# Patient Record
Sex: FEMALE | Race: BLACK OR AFRICAN AMERICAN | NOT HISPANIC OR LATINO
[De-identification: names, ages, dates, MRNs, and addresses within clinical notes are randomized per-mention and may not be internally consistent; named-entity substitution may affect disease eponyms.]

---

## 2017-01-28 ENCOUNTER — RESULT REVIEW (OUTPATIENT)
Age: 32
End: 2017-01-28

## 2017-01-28 ENCOUNTER — INPATIENT (INPATIENT)
Facility: HOSPITAL | Age: 32
LOS: 2 days | Discharge: HOME | End: 2017-01-31
Attending: OBSTETRICS & GYNECOLOGY | Admitting: OBSTETRICS & GYNECOLOGY
Payer: COMMERCIAL

## 2017-01-28 VITALS — WEIGHT: 169.76 LBS | HEIGHT: 63 IN

## 2017-01-28 DIAGNOSIS — O26.899 OTHER SPECIFIED PREGNANCY RELATED CONDITIONS, UNSPECIFIED TRIMESTER: ICD-10-CM

## 2017-01-28 DIAGNOSIS — Z3A.00 WEEKS OF GESTATION OF PREGNANCY NOT SPECIFIED: ICD-10-CM

## 2017-01-28 LAB
BASOPHILS # BLD AUTO: 0.02 K/UL — SIGNIFICANT CHANGE UP (ref 0–0.2)
BASOPHILS NFR BLD AUTO: 0.1 % — SIGNIFICANT CHANGE UP (ref 0–2)
EOSINOPHIL # BLD AUTO: 0.02 K/UL — SIGNIFICANT CHANGE UP (ref 0–0.5)
EOSINOPHIL NFR BLD AUTO: 0.1 % — SIGNIFICANT CHANGE UP (ref 0–6)
HCT VFR BLD CALC: 33.9 % — LOW (ref 34.5–45)
HGB BLD-MCNC: 10.9 G/DL — LOW (ref 11.5–15.5)
IMM GRANULOCYTES NFR BLD AUTO: 0.5 % — SIGNIFICANT CHANGE UP (ref 0–1.5)
LYMPHOCYTES # BLD AUTO: 25.1 % — SIGNIFICANT CHANGE UP (ref 13–44)
LYMPHOCYTES # BLD AUTO: 3.39 K/UL — HIGH (ref 1–3.3)
MCHC RBC-ENTMCNC: 27.4 PG — SIGNIFICANT CHANGE UP (ref 27–34)
MCHC RBC-ENTMCNC: 32.2 % — SIGNIFICANT CHANGE UP (ref 32–36)
MCV RBC AUTO: 85.2 FL — SIGNIFICANT CHANGE UP (ref 80–100)
MONOCYTES # BLD AUTO: 1.1 K/UL — HIGH (ref 0–0.9)
MONOCYTES NFR BLD AUTO: 8.1 % — SIGNIFICANT CHANGE UP (ref 2–14)
NEUTROPHILS # BLD AUTO: 8.9 K/UL — HIGH (ref 1.8–7.4)
NEUTROPHILS NFR BLD AUTO: 66.1 % — SIGNIFICANT CHANGE UP (ref 43–77)
PLATELET # BLD AUTO: 226 K/UL — SIGNIFICANT CHANGE UP (ref 150–400)
PMV BLD: 10.5 FL — SIGNIFICANT CHANGE UP (ref 7–13)
RBC # BLD: 3.98 M/UL — SIGNIFICANT CHANGE UP (ref 3.8–5.2)
RBC # FLD: 16.7 % — HIGH (ref 10.3–14.5)
WBC # BLD: 13.5 K/UL — HIGH (ref 3.8–10.5)
WBC # FLD AUTO: 13.5 K/UL — HIGH (ref 3.8–10.5)

## 2017-01-28 RX ORDER — OXYTOCIN 10 UNIT/ML
333.33 VIAL (ML) INJECTION
Qty: 20 | Refills: 0 | Status: DISCONTINUED | OUTPATIENT
Start: 2017-01-28 | End: 2017-01-29

## 2017-01-28 RX ORDER — INFLUENZA VIRUS VACCINE 15; 15; 15; 15 UG/.5ML; UG/.5ML; UG/.5ML; UG/.5ML
0.5 SUSPENSION INTRAMUSCULAR ONCE
Qty: 0 | Refills: 0 | Status: COMPLETED | OUTPATIENT
Start: 2017-01-28 | End: 2017-01-30

## 2017-01-28 RX ORDER — SODIUM CHLORIDE 9 MG/ML
1000 INJECTION, SOLUTION INTRAVENOUS ONCE
Qty: 0 | Refills: 0 | Status: COMPLETED | OUTPATIENT
Start: 2017-01-28 | End: 2017-01-28

## 2017-01-28 RX ORDER — SODIUM CHLORIDE 9 MG/ML
1000 INJECTION, SOLUTION INTRAVENOUS
Qty: 0 | Refills: 0 | Status: DISCONTINUED | OUTPATIENT
Start: 2017-01-28 | End: 2017-01-29

## 2017-01-28 RX ADMIN — SODIUM CHLORIDE 2000 MILLILITER(S): 9 INJECTION, SOLUTION INTRAVENOUS at 23:30

## 2017-01-29 LAB
ANISOCYTOSIS BLD QL: SLIGHT — SIGNIFICANT CHANGE UP
BASOPHILS NFR SPEC: 0 % — SIGNIFICANT CHANGE UP (ref 0–2)
BLD GP AB SCN SERPL QL: NEGATIVE — SIGNIFICANT CHANGE UP
EOSINOPHIL NFR FLD: 0 % — SIGNIFICANT CHANGE UP (ref 0–6)
LYMPHOCYTES NFR SPEC AUTO: 18 % — SIGNIFICANT CHANGE UP (ref 13–44)
MANUAL SMEAR VERIFICATION: SIGNIFICANT CHANGE UP
MONOCYTES NFR BLD: 6 % — SIGNIFICANT CHANGE UP (ref 2–9)
NEUTROPHIL AB SER-ACNC: 76 % — SIGNIFICANT CHANGE UP (ref 43–77)
PLATELET COUNT - ESTIMATE: NORMAL — SIGNIFICANT CHANGE UP
RH IG SCN BLD-IMP: POSITIVE — SIGNIFICANT CHANGE UP
RH IG SCN BLD-IMP: POSITIVE — SIGNIFICANT CHANGE UP

## 2017-01-29 PROCEDURE — 88307 TISSUE EXAM BY PATHOLOGIST: CPT | Mod: 26

## 2017-01-29 RX ORDER — MAGNESIUM HYDROXIDE 400 MG/1
30 TABLET, CHEWABLE ORAL
Qty: 0 | Refills: 0 | Status: DISCONTINUED | OUTPATIENT
Start: 2017-01-29 | End: 2017-01-31

## 2017-01-29 RX ORDER — OXYTOCIN 10 UNIT/ML
2 VIAL (ML) INJECTION
Qty: 30 | Refills: 0 | Status: DISCONTINUED | OUTPATIENT
Start: 2017-01-29 | End: 2017-01-29

## 2017-01-29 RX ORDER — IBUPROFEN 200 MG
600 TABLET ORAL EVERY 6 HOURS
Qty: 0 | Refills: 0 | Status: DISCONTINUED | OUTPATIENT
Start: 2017-01-29 | End: 2017-01-29

## 2017-01-29 RX ORDER — AER TRAVELER 0.5 G/1
1 SOLUTION RECTAL; TOPICAL EVERY 4 HOURS
Qty: 0 | Refills: 0 | Status: DISCONTINUED | OUTPATIENT
Start: 2017-01-29 | End: 2017-01-31

## 2017-01-29 RX ORDER — ONDANSETRON 8 MG/1
4 TABLET, FILM COATED ORAL ONCE
Qty: 0 | Refills: 0 | Status: COMPLETED | OUTPATIENT
Start: 2017-01-29 | End: 2017-01-29

## 2017-01-29 RX ORDER — SODIUM CHLORIDE 9 MG/ML
3 INJECTION INTRAMUSCULAR; INTRAVENOUS; SUBCUTANEOUS EVERY 8 HOURS
Qty: 0 | Refills: 0 | Status: DISCONTINUED | OUTPATIENT
Start: 2017-01-29 | End: 2017-01-29

## 2017-01-29 RX ORDER — DIPHENHYDRAMINE HCL 50 MG
25 CAPSULE ORAL EVERY 6 HOURS
Qty: 0 | Refills: 0 | Status: DISCONTINUED | OUTPATIENT
Start: 2017-01-29 | End: 2017-01-31

## 2017-01-29 RX ORDER — OXYTOCIN 10 UNIT/ML
41.67 VIAL (ML) INJECTION
Qty: 20 | Refills: 0 | Status: DISCONTINUED | OUTPATIENT
Start: 2017-01-29 | End: 2017-01-29

## 2017-01-29 RX ORDER — DIBUCAINE 1 %
1 OINTMENT (GRAM) RECTAL EVERY 4 HOURS
Qty: 0 | Refills: 0 | Status: DISCONTINUED | OUTPATIENT
Start: 2017-01-29 | End: 2017-01-31

## 2017-01-29 RX ORDER — TETANUS TOXOID, REDUCED DIPHTHERIA TOXOID AND ACELLULAR PERTUSSIS VACCINE, ADSORBED 5; 2.5; 8; 8; 2.5 [IU]/.5ML; [IU]/.5ML; UG/.5ML; UG/.5ML; UG/.5ML
0.5 SUSPENSION INTRAMUSCULAR ONCE
Qty: 0 | Refills: 0 | Status: COMPLETED | OUTPATIENT
Start: 2017-01-29

## 2017-01-29 RX ORDER — HYDROCORTISONE 1 %
1 OINTMENT (GRAM) TOPICAL EVERY 4 HOURS
Qty: 0 | Refills: 0 | Status: DISCONTINUED | OUTPATIENT
Start: 2017-01-29 | End: 2017-01-29

## 2017-01-29 RX ORDER — KETOROLAC TROMETHAMINE 30 MG/ML
30 SYRINGE (ML) INJECTION ONCE
Qty: 0 | Refills: 0 | Status: DISCONTINUED | OUTPATIENT
Start: 2017-01-29 | End: 2017-01-29

## 2017-01-29 RX ORDER — AER TRAVELER 0.5 G/1
1 SOLUTION RECTAL; TOPICAL EVERY 4 HOURS
Qty: 0 | Refills: 0 | Status: DISCONTINUED | OUTPATIENT
Start: 2017-01-29 | End: 2017-01-29

## 2017-01-29 RX ORDER — PRAMOXINE HYDROCHLORIDE 150 MG/15G
1 AEROSOL, FOAM RECTAL EVERY 4 HOURS
Qty: 0 | Refills: 0 | Status: DISCONTINUED | OUTPATIENT
Start: 2017-01-29 | End: 2017-01-29

## 2017-01-29 RX ORDER — SODIUM CHLORIDE 9 MG/ML
3 INJECTION INTRAMUSCULAR; INTRAVENOUS; SUBCUTANEOUS EVERY 8 HOURS
Qty: 0 | Refills: 0 | Status: DISCONTINUED | OUTPATIENT
Start: 2017-01-29 | End: 2017-01-30

## 2017-01-29 RX ORDER — OXYCODONE HYDROCHLORIDE 5 MG/1
5 TABLET ORAL
Qty: 0 | Refills: 0 | Status: DISCONTINUED | OUTPATIENT
Start: 2017-01-29 | End: 2017-01-31

## 2017-01-29 RX ORDER — ACETAMINOPHEN 500 MG
975 TABLET ORAL EVERY 6 HOURS
Qty: 0 | Refills: 0 | Status: DISCONTINUED | OUTPATIENT
Start: 2017-01-29 | End: 2017-01-31

## 2017-01-29 RX ORDER — DIBUCAINE 1 %
1 OINTMENT (GRAM) RECTAL EVERY 4 HOURS
Qty: 0 | Refills: 0 | Status: DISCONTINUED | OUTPATIENT
Start: 2017-01-29 | End: 2017-01-29

## 2017-01-29 RX ORDER — OXYTOCIN 10 UNIT/ML
41.67 VIAL (ML) INJECTION
Qty: 20 | Refills: 0 | Status: DISCONTINUED | OUTPATIENT
Start: 2017-01-29 | End: 2017-01-30

## 2017-01-29 RX ORDER — LANOLIN
1 OINTMENT (GRAM) TOPICAL EVERY 6 HOURS
Qty: 0 | Refills: 0 | Status: DISCONTINUED | OUTPATIENT
Start: 2017-01-29 | End: 2017-01-31

## 2017-01-29 RX ORDER — DOCUSATE SODIUM 100 MG
100 CAPSULE ORAL
Qty: 0 | Refills: 0 | Status: DISCONTINUED | OUTPATIENT
Start: 2017-01-29 | End: 2017-01-31

## 2017-01-29 RX ORDER — PRAMOXINE HYDROCHLORIDE 150 MG/15G
1 AEROSOL, FOAM RECTAL EVERY 4 HOURS
Qty: 0 | Refills: 0 | Status: DISCONTINUED | OUTPATIENT
Start: 2017-01-29 | End: 2017-01-31

## 2017-01-29 RX ORDER — GLYCERIN ADULT
1 SUPPOSITORY, RECTAL RECTAL AT BEDTIME
Qty: 0 | Refills: 0 | Status: DISCONTINUED | OUTPATIENT
Start: 2017-01-29 | End: 2017-01-31

## 2017-01-29 RX ORDER — SIMETHICONE 80 MG/1
80 TABLET, CHEWABLE ORAL EVERY 6 HOURS
Qty: 0 | Refills: 0 | Status: DISCONTINUED | OUTPATIENT
Start: 2017-01-29 | End: 2017-01-31

## 2017-01-29 RX ORDER — OXYCODONE HYDROCHLORIDE 5 MG/1
5 TABLET ORAL EVERY 4 HOURS
Qty: 0 | Refills: 0 | Status: DISCONTINUED | OUTPATIENT
Start: 2017-01-29 | End: 2017-01-31

## 2017-01-29 RX ORDER — HYDROCORTISONE 1 %
1 OINTMENT (GRAM) TOPICAL EVERY 4 HOURS
Qty: 0 | Refills: 0 | Status: DISCONTINUED | OUTPATIENT
Start: 2017-01-29 | End: 2017-01-31

## 2017-01-29 RX ADMIN — Medication 30 MILLIGRAM(S): at 11:50

## 2017-01-29 RX ADMIN — Medication 975 MILLIGRAM(S): at 18:40

## 2017-01-29 RX ADMIN — Medication 975 MILLIGRAM(S): at 17:49

## 2017-01-29 RX ADMIN — SODIUM CHLORIDE 125 MILLILITER(S): 9 INJECTION, SOLUTION INTRAVENOUS at 00:50

## 2017-01-29 RX ADMIN — SODIUM CHLORIDE 3 MILLILITER(S): 9 INJECTION INTRAMUSCULAR; INTRAVENOUS; SUBCUTANEOUS at 22:08

## 2017-01-29 RX ADMIN — Medication 2 MILLIUNIT(S)/MIN: at 09:08

## 2017-01-29 RX ADMIN — ONDANSETRON 4 MILLIGRAM(S): 8 TABLET, FILM COATED ORAL at 05:01

## 2017-01-29 NOTE — PROVIDER CONTACT NOTE (OTHER) - BACKGROUND
Pt is  from 1051 am.Had voided x1 in labor and delivery.Vitals on admit to 6 tower 122/73,103,36.7 orally.

## 2017-01-29 NOTE — DISCHARGE NOTE OB - MATERIALS PROVIDED
Birth Certificate Instructions/[facility] Hearing Screen Program/Vaccinations/  Immunization Record/Breastfeeding Log/Guide to Postpartum Care/Shaken Baby Prevention Handout

## 2017-01-29 NOTE — PROVIDER CONTACT NOTE (OTHER) - ACTION/TREATMENT ORDERED:
Pt tolerated oob well.Fundus firm at umbilicus now.Vitals bp 117/72,heart rate 94.Lochia mod rubra.Ob resident Dr. Bryson made aware.

## 2017-01-29 NOTE — DISCHARGE NOTE OB - PATIENT PORTAL LINK FT
“You can access the FollowHealth Patient Portal, offered by [facility], by registering with the following website: [email or web address]”

## 2017-01-29 NOTE — DISCHARGE NOTE OB - CARE PROVIDER_API CALL
Betzy Cruz), Obstetrics and Gynecology  [address]  [place] [address]  Phone: [phone]  Fax: [phone]

## 2017-01-29 NOTE — DISCHARGE NOTE OB - CARE PLAN
Principal Discharge DX:	Postpartum care following vaginal delivery  Goal:	full recovery  Instructions for follow-up, activity and diet:	nothing per vagina, no heavy lifting for 6 weeks

## 2017-01-29 NOTE — PROVIDER CONTACT NOTE (OTHER) - ASSESSMENT
On admit fundus firm 1 above umbilicus.Lochia heavy moderate rubra on pads.Pt was able to void again in the bathroom and passed the clot.

## 2017-01-30 LAB — T PALLIDUM AB TITR SER: NEGATIVE — SIGNIFICANT CHANGE UP

## 2017-01-30 RX ORDER — ALBUTEROL 90 UG/1
2 AEROSOL, METERED ORAL EVERY 6 HOURS
Qty: 0 | Refills: 0 | Status: DISCONTINUED | OUTPATIENT
Start: 2017-01-30 | End: 2017-01-31

## 2017-01-30 RX ADMIN — Medication 975 MILLIGRAM(S): at 08:09

## 2017-01-30 RX ADMIN — Medication 975 MILLIGRAM(S): at 22:56

## 2017-01-30 RX ADMIN — Medication 1 TABLET(S): at 13:51

## 2017-01-30 RX ADMIN — INFLUENZA VIRUS VACCINE 0.5 MILLILITER(S): 15; 15; 15; 15 SUSPENSION INTRAMUSCULAR at 05:29

## 2017-01-30 RX ADMIN — Medication 975 MILLIGRAM(S): at 14:30

## 2017-01-30 RX ADMIN — Medication 975 MILLIGRAM(S): at 01:07

## 2017-01-30 RX ADMIN — Medication 975 MILLIGRAM(S): at 02:08

## 2017-01-30 RX ADMIN — Medication 975 MILLIGRAM(S): at 13:51

## 2017-01-30 RX ADMIN — Medication 975 MILLIGRAM(S): at 22:11

## 2017-01-30 RX ADMIN — Medication 975 MILLIGRAM(S): at 09:00

## 2017-01-30 NOTE — LACTATION INITIAL EVALUATION - INTERVENTION OUTCOME
demonstrates understanding of teaching/verbalizes understanding/good return demonstration/reviewed skin to skin, feeding cues, feeding on demand, wet and soiled diaper log;  encouraged to ask for assistance with breastfeeding as needed

## 2017-01-31 VITALS
TEMPERATURE: 99 F | DIASTOLIC BLOOD PRESSURE: 65 MMHG | HEART RATE: 93 BPM | RESPIRATION RATE: 18 BRPM | SYSTOLIC BLOOD PRESSURE: 105 MMHG | OXYGEN SATURATION: 100 %

## 2017-01-31 RX ORDER — TETANUS TOXOID, REDUCED DIPHTHERIA TOXOID AND ACELLULAR PERTUSSIS VACCINE, ADSORBED 5; 2.5; 8; 8; 2.5 [IU]/.5ML; [IU]/.5ML; UG/.5ML; UG/.5ML; UG/.5ML
0.5 SUSPENSION INTRAMUSCULAR ONCE
Qty: 0 | Refills: 0 | Status: COMPLETED | OUTPATIENT
Start: 2017-01-31 | End: 2017-01-31

## 2017-01-31 RX ADMIN — Medication 975 MILLIGRAM(S): at 07:15

## 2017-01-31 RX ADMIN — Medication 975 MILLIGRAM(S): at 06:28

## 2017-01-31 RX ADMIN — TETANUS TOXOID, REDUCED DIPHTHERIA TOXOID AND ACELLULAR PERTUSSIS VACCINE, ADSORBED 0.5 MILLILITER(S): 5; 2.5; 8; 8; 2.5 SUSPENSION INTRAMUSCULAR at 11:50

## 2018-08-24 ENCOUNTER — TRANSCRIPTION ENCOUNTER (OUTPATIENT)
Age: 33
End: 2018-08-24

## 2018-08-24 ENCOUNTER — EMERGENCY (EMERGENCY)
Facility: HOSPITAL | Age: 33
LOS: 1 days | Discharge: HOME | End: 2018-08-24
Attending: EMERGENCY MEDICINE | Admitting: EMERGENCY MEDICINE
Payer: SELF-PAY

## 2018-08-24 VITALS
OXYGEN SATURATION: 100 % | SYSTOLIC BLOOD PRESSURE: 111 MMHG | DIASTOLIC BLOOD PRESSURE: 68 MMHG | TEMPERATURE: 99 F | HEART RATE: 87 BPM | RESPIRATION RATE: 18 BRPM

## 2018-08-24 VITALS
RESPIRATION RATE: 18 BRPM | HEART RATE: 80 BPM | DIASTOLIC BLOOD PRESSURE: 88 MMHG | SYSTOLIC BLOOD PRESSURE: 127 MMHG | OXYGEN SATURATION: 100 % | TEMPERATURE: 98 F

## 2018-08-24 LAB
ALBUMIN SERPL ELPH-MCNC: 4.4 G/DL — SIGNIFICANT CHANGE UP (ref 3.3–5)
ALP SERPL-CCNC: 56 U/L — SIGNIFICANT CHANGE UP (ref 40–120)
ALT FLD-CCNC: 19 U/L — SIGNIFICANT CHANGE UP (ref 4–33)
APPEARANCE UR: CLEAR — SIGNIFICANT CHANGE UP
APTT BLD: 28.1 SEC — SIGNIFICANT CHANGE UP (ref 27.5–37.4)
AST SERPL-CCNC: 46 U/L — HIGH (ref 4–32)
BACTERIA # UR AUTO: NEGATIVE — SIGNIFICANT CHANGE UP
BASE EXCESS BLDV CALC-SCNC: -1.1 MMOL/L — SIGNIFICANT CHANGE UP
BASE EXCESS BLDV CALC-SCNC: 0.8 MMOL/L — SIGNIFICANT CHANGE UP
BASOPHILS # BLD AUTO: 0.07 K/UL — SIGNIFICANT CHANGE UP (ref 0–0.2)
BASOPHILS NFR BLD AUTO: 0.4 % — SIGNIFICANT CHANGE UP (ref 0–2)
BILIRUB SERPL-MCNC: 1.1 MG/DL — SIGNIFICANT CHANGE UP (ref 0.2–1.2)
BILIRUB UR-MCNC: NEGATIVE — SIGNIFICANT CHANGE UP
BLD GP AB SCN SERPL QL: NEGATIVE — SIGNIFICANT CHANGE UP
BLOOD GAS VENOUS - CREATININE: 0.61 MG/DL — SIGNIFICANT CHANGE UP (ref 0.5–1.3)
BLOOD GAS VENOUS - CREATININE: 0.67 MG/DL — SIGNIFICANT CHANGE UP (ref 0.5–1.3)
BLOOD UR QL VISUAL: SIGNIFICANT CHANGE UP
BUN SERPL-MCNC: 11 MG/DL — SIGNIFICANT CHANGE UP (ref 7–23)
BUN SERPL-MCNC: 12 MG/DL — SIGNIFICANT CHANGE UP (ref 7–23)
CALCIUM SERPL-MCNC: 9.1 MG/DL — SIGNIFICANT CHANGE UP (ref 8.4–10.5)
CALCIUM SERPL-MCNC: 9.3 MG/DL — SIGNIFICANT CHANGE UP (ref 8.4–10.5)
CHLORIDE BLDV-SCNC: 106 MMOL/L — SIGNIFICANT CHANGE UP (ref 96–108)
CHLORIDE BLDV-SCNC: 106 MMOL/L — SIGNIFICANT CHANGE UP (ref 96–108)
CHLORIDE SERPL-SCNC: 103 MMOL/L — SIGNIFICANT CHANGE UP (ref 98–107)
CHLORIDE SERPL-SCNC: 104 MMOL/L — SIGNIFICANT CHANGE UP (ref 98–107)
CO2 SERPL-SCNC: 20 MMOL/L — LOW (ref 22–31)
CO2 SERPL-SCNC: 23 MMOL/L — SIGNIFICANT CHANGE UP (ref 22–31)
COLOR SPEC: YELLOW — SIGNIFICANT CHANGE UP
CREAT SERPL-MCNC: 0.72 MG/DL — SIGNIFICANT CHANGE UP (ref 0.5–1.3)
CREAT SERPL-MCNC: 0.76 MG/DL — SIGNIFICANT CHANGE UP (ref 0.5–1.3)
EOSINOPHIL # BLD AUTO: 0.18 K/UL — SIGNIFICANT CHANGE UP (ref 0–0.5)
EOSINOPHIL NFR BLD AUTO: 0.9 % — SIGNIFICANT CHANGE UP (ref 0–6)
GAS PNL BLDV: 136 MMOL/L — SIGNIFICANT CHANGE UP (ref 136–146)
GAS PNL BLDV: 136 MMOL/L — SIGNIFICANT CHANGE UP (ref 136–146)
GLUCOSE BLDV-MCNC: 92 — SIGNIFICANT CHANGE UP (ref 70–99)
GLUCOSE BLDV-MCNC: 93 — SIGNIFICANT CHANGE UP (ref 70–99)
GLUCOSE SERPL-MCNC: 103 MG/DL — HIGH (ref 70–99)
GLUCOSE SERPL-MCNC: 87 MG/DL — SIGNIFICANT CHANGE UP (ref 70–99)
GLUCOSE UR-MCNC: NEGATIVE — SIGNIFICANT CHANGE UP
HCG UR-SCNC: NEGATIVE — SIGNIFICANT CHANGE UP
HCO3 BLDV-SCNC: 21 MMOL/L — SIGNIFICANT CHANGE UP (ref 20–27)
HCO3 BLDV-SCNC: 23 MMOL/L — SIGNIFICANT CHANGE UP (ref 20–27)
HCT VFR BLD CALC: 42.7 % — SIGNIFICANT CHANGE UP (ref 34.5–45)
HCT VFR BLDV CALC: 41.9 % — SIGNIFICANT CHANGE UP (ref 34.5–45)
HCT VFR BLDV CALC: 42.6 % — SIGNIFICANT CHANGE UP (ref 34.5–45)
HGB BLD-MCNC: 13.9 G/DL — SIGNIFICANT CHANGE UP (ref 11.5–15.5)
HGB BLDV-MCNC: 13.6 G/DL — SIGNIFICANT CHANGE UP (ref 11.5–15.5)
HGB BLDV-MCNC: 13.9 G/DL — SIGNIFICANT CHANGE UP (ref 11.5–15.5)
HYALINE CASTS # UR AUTO: HIGH
IMM GRANULOCYTES # BLD AUTO: 0.08 # — SIGNIFICANT CHANGE UP
IMM GRANULOCYTES NFR BLD AUTO: 0.4 % — SIGNIFICANT CHANGE UP (ref 0–1.5)
INR BLD: 0.97 — SIGNIFICANT CHANGE UP (ref 0.88–1.17)
KETONES UR-MCNC: NEGATIVE — SIGNIFICANT CHANGE UP
LACTATE BLDV-MCNC: 1.9 MMOL/L — SIGNIFICANT CHANGE UP (ref 0.5–2)
LACTATE BLDV-MCNC: 2.5 MMOL/L — HIGH (ref 0.5–2)
LEUKOCYTE ESTERASE UR-ACNC: SIGNIFICANT CHANGE UP
LYMPHOCYTES # BLD AUTO: 11.1 % — LOW (ref 13–44)
LYMPHOCYTES # BLD AUTO: 2.16 K/UL — SIGNIFICANT CHANGE UP (ref 1–3.3)
MCHC RBC-ENTMCNC: 30 PG — SIGNIFICANT CHANGE UP (ref 27–34)
MCHC RBC-ENTMCNC: 32.6 % — SIGNIFICANT CHANGE UP (ref 32–36)
MCV RBC AUTO: 92 FL — SIGNIFICANT CHANGE UP (ref 80–100)
MONOCYTES # BLD AUTO: 0.8 K/UL — SIGNIFICANT CHANGE UP (ref 0–0.9)
MONOCYTES NFR BLD AUTO: 4.1 % — SIGNIFICANT CHANGE UP (ref 2–14)
NEUTROPHILS # BLD AUTO: 16.23 K/UL — HIGH (ref 1.8–7.4)
NEUTROPHILS NFR BLD AUTO: 83.1 % — HIGH (ref 43–77)
NITRITE UR-MCNC: NEGATIVE — SIGNIFICANT CHANGE UP
NRBC # FLD: 0 — SIGNIFICANT CHANGE UP
PCO2 BLDV: 47 MMHG — SIGNIFICANT CHANGE UP (ref 41–51)
PCO2 BLDV: 58 MMHG — HIGH (ref 41–51)
PH BLDV: 7.26 PH — LOW (ref 7.32–7.43)
PH BLDV: 7.36 PH — SIGNIFICANT CHANGE UP (ref 7.32–7.43)
PH UR: 6 — SIGNIFICANT CHANGE UP (ref 5–8)
PLATELET # BLD AUTO: 289 K/UL — SIGNIFICANT CHANGE UP (ref 150–400)
PMV BLD: 11 FL — SIGNIFICANT CHANGE UP (ref 7–13)
PO2 BLDV: 19 MMHG — LOW (ref 35–40)
PO2 BLDV: 22 MMHG — LOW (ref 35–40)
POTASSIUM BLDV-SCNC: 4 MMOL/L — SIGNIFICANT CHANGE UP (ref 3.4–4.5)
POTASSIUM BLDV-SCNC: 6.4 MMOL/L — CRITICAL HIGH (ref 3.4–4.5)
POTASSIUM SERPL-MCNC: 4.2 MMOL/L — SIGNIFICANT CHANGE UP (ref 3.5–5.3)
POTASSIUM SERPL-MCNC: SIGNIFICANT CHANGE UP MMOL/L (ref 3.5–5.3)
POTASSIUM SERPL-SCNC: 4.2 MMOL/L — SIGNIFICANT CHANGE UP (ref 3.5–5.3)
POTASSIUM SERPL-SCNC: SIGNIFICANT CHANGE UP MMOL/L (ref 3.5–5.3)
PROT SERPL-MCNC: 7.9 G/DL — SIGNIFICANT CHANGE UP (ref 6–8.3)
PROT UR-MCNC: SIGNIFICANT CHANGE UP
PROTHROM AB SERPL-ACNC: 11.2 SEC — SIGNIFICANT CHANGE UP (ref 9.8–13.1)
RBC # BLD: 4.64 M/UL — SIGNIFICANT CHANGE UP (ref 3.8–5.2)
RBC # FLD: 13.6 % — SIGNIFICANT CHANGE UP (ref 10.3–14.5)
RBC CASTS # UR COMP ASSIST: SIGNIFICANT CHANGE UP (ref 0–?)
RH IG SCN BLD-IMP: POSITIVE — SIGNIFICANT CHANGE UP
SAO2 % BLDV: 19.8 % — LOW (ref 60–85)
SAO2 % BLDV: 28.9 % — LOW (ref 60–85)
SODIUM SERPL-SCNC: 134 MMOL/L — LOW (ref 135–145)
SODIUM SERPL-SCNC: 137 MMOL/L — SIGNIFICANT CHANGE UP (ref 135–145)
SP GR SPEC: 1.03 — SIGNIFICANT CHANGE UP (ref 1–1.04)
SP GR UR: 1.03 — SIGNIFICANT CHANGE UP (ref 1–1.03)
SQUAMOUS # UR AUTO: SIGNIFICANT CHANGE UP
UROBILINOGEN FLD QL: NORMAL — SIGNIFICANT CHANGE UP
WBC # BLD: 19.52 K/UL — HIGH (ref 3.8–10.5)
WBC # FLD AUTO: 19.52 K/UL — HIGH (ref 3.8–10.5)
WBC UR QL: HIGH (ref 0–?)

## 2018-08-24 PROCEDURE — 99053 MED SERV 10PM-8AM 24 HR FAC: CPT

## 2018-08-24 PROCEDURE — 99285 EMERGENCY DEPT VISIT HI MDM: CPT | Mod: 25

## 2018-08-24 PROCEDURE — 74177 CT ABD & PELVIS W/CONTRAST: CPT | Mod: 26

## 2018-08-24 RX ORDER — KETOROLAC TROMETHAMINE 30 MG/ML
30 SYRINGE (ML) INJECTION ONCE
Qty: 0 | Refills: 0 | Status: DISCONTINUED | OUTPATIENT
Start: 2018-08-24 | End: 2018-08-24

## 2018-08-24 RX ORDER — MORPHINE SULFATE 50 MG/1
4 CAPSULE, EXTENDED RELEASE ORAL ONCE
Qty: 0 | Refills: 0 | Status: DISCONTINUED | OUTPATIENT
Start: 2018-08-24 | End: 2018-08-24

## 2018-08-24 RX ORDER — CEPHALEXIN 500 MG
1 CAPSULE ORAL
Qty: 14 | Refills: 0 | OUTPATIENT
Start: 2018-08-24 | End: 2018-08-30

## 2018-08-24 RX ORDER — SODIUM CHLORIDE 9 MG/ML
1000 INJECTION INTRAMUSCULAR; INTRAVENOUS; SUBCUTANEOUS ONCE
Qty: 0 | Refills: 0 | Status: COMPLETED | OUTPATIENT
Start: 2018-08-24 | End: 2018-08-24

## 2018-08-24 RX ORDER — DIPHENHYDRAMINE HCL 50 MG
25 CAPSULE ORAL ONCE
Qty: 0 | Refills: 0 | Status: COMPLETED | OUTPATIENT
Start: 2018-08-24 | End: 2018-08-24

## 2018-08-24 RX ADMIN — MORPHINE SULFATE 4 MILLIGRAM(S): 50 CAPSULE, EXTENDED RELEASE ORAL at 08:59

## 2018-08-24 RX ADMIN — Medication 25 MILLIGRAM(S): at 10:06

## 2018-08-24 RX ADMIN — MORPHINE SULFATE 4 MILLIGRAM(S): 50 CAPSULE, EXTENDED RELEASE ORAL at 10:00

## 2018-08-24 RX ADMIN — SODIUM CHLORIDE 1000 MILLILITER(S): 9 INJECTION INTRAMUSCULAR; INTRAVENOUS; SUBCUTANEOUS at 09:59

## 2018-08-24 RX ADMIN — Medication 30 MILLIGRAM(S): at 05:05

## 2018-08-24 RX ADMIN — SODIUM CHLORIDE 1000 MILLILITER(S): 9 INJECTION INTRAMUSCULAR; INTRAVENOUS; SUBCUTANEOUS at 04:50

## 2018-08-24 RX ADMIN — Medication 30 MILLIGRAM(S): at 04:50

## 2018-08-24 NOTE — ED ADULT NURSE NOTE - OBJECTIVE STATEMENT
Michelle RN: The patient is a 32 y/o female a&ox4 p/w a c/c RLQ that began acutely at 2200 hours last night.  Patient denies N/V/D, reports last BM approx one hour ago, denies blood in stool.  Patient denies fevers/chills, SOB, CP.  Respirations unlabored, 20 gauge PIV placed in left hand, vitals as noted, MD at bedside.

## 2018-08-24 NOTE — ED ADULT TRIAGE NOTE - CHIEF COMPLAINT QUOTE
abd pain    rlq pain since 10pm, dry mouth... denies nausea, vomiting, diarrhea, dysuria, vag discharge... passing flatus...last normal bm approx 1 hrs ago.  ,

## 2018-08-24 NOTE — ED PROVIDER NOTE - PLAN OF CARE
Advance activity as tolerated.  Continue all previously prescribed medications as directed unless otherwise instructed.  Take Tylenol 650mg (Two 325 mg pills) every 4-6 hours as needed for pain or fevers. Take Motrin (also sold as Advil or Ibuprofen) 400-600 mg (two or three 200 mg over the counter pills) every 8 hours as needed for moderate pain or fevers-- take with food. Take Keflex 500 mg twice a day for 7 days -- finish this antibiotic.  Follow up with your primary care physician in 48-72 hours- bring copies of your results.  Return to the ER for worsening or persistent symptoms, including but not limited to worsening/persistent pain, fevers, vomiting, flank pain, and/or ANY NEW OR CONCERNING SYMPTOMS. If you have issues obtaining follow up, please call: [phone] to obtain a doctor or specialist who takes your insurance in your area.  You may call [phone] to make an appointment with the internal medicine clinic.

## 2018-08-24 NOTE — ED ADULT NURSE REASSESSMENT NOTE - NS ED NURSE REASSESS COMMENT FT1
Report received from break coverage YULI Hernandez. Pt. received with 20 gauge IV in left hand. Pt. c/o pain. Toradol given as per order. Respirations are even and unlabored on room air. MD at bedside. Will continue to monitor.

## 2018-08-24 NOTE — ED PROVIDER NOTE - CARE PLAN
Principal Discharge DX:	Urinary tract infection with hematuria, site unspecified Principal Discharge DX:	Urinary tract infection with hematuria, site unspecified  Assessment and plan of treatment:	Advance activity as tolerated.  Continue all previously prescribed medications as directed unless otherwise instructed.  Take Tylenol 650mg (Two 325 mg pills) every 4-6 hours as needed for pain or fevers. Take Motrin (also sold as Advil or Ibuprofen) 400-600 mg (two or three 200 mg over the counter pills) every 8 hours as needed for moderate pain or fevers-- take with food. Take Keflex 500 mg twice a day for 7 days -- finish this antibiotic.  Follow up with your primary care physician in 48-72 hours- bring copies of your results.  Return to the ER for worsening or persistent symptoms, including but not limited to worsening/persistent pain, fevers, vomiting, flank pain, and/or ANY NEW OR CONCERNING SYMPTOMS. If you have issues obtaining follow up, please call: [phone] to obtain a doctor or specialist who takes your insurance in your area.  You may call [phone] to make an appointment with the internal medicine clinic.

## 2018-08-24 NOTE — ED PROVIDER NOTE - OBJECTIVE STATEMENT
Gurdeep PLUMMER MD PGY1: 33 F no PMH p/w constant RLQ pain x 2 days. No inciting factor, worsening. Worsens with movement and sitting up from a lying down position. Assoc nausea and reduced appetite, no emesis. Normal BM this am, passing flatus. No measured fever, but feels feverish. LMP last week. No c/f pregnancy or STD.

## 2018-08-24 NOTE — ED PROVIDER NOTE - ATTENDING CONTRIBUTION TO CARE
DR. DICK, ATTENDING MD-  I performed a face to face bedside interview with patient regarding history of present illness, review of symptoms and past medical history. I completed an independent physical exam.  I have discussed patient's plan of care with the resident.   Documentation as above in the note.   HPI: 33 F no PMH p/w constant RLQ pain x 2 days. No inciting factor. Worsens with movement and sitting up from a lying down position. Assoc nausea and reduced appetite, no emesis. Normal BM this am, passing flatus. No measured fever, but feels feverish. LMP last week. No c/f pregnancy or STD. no hx abd surgeries. Sexually active, no vag bleeding or discharge.   EXAM: Well appearing, Abd tenderness to palpation RLQ and + rovsings sign, no guarding, no rebound, NEG CVATB. (pelvic see resident portion note female chaperone Brendon MATAMOROS)  MDM: concern for appendicitis vs colitis, unlikely Ovarian pathology. Will obtain labs, UA, preg, CT and provide pain meds and reassess.

## 2018-08-24 NOTE — ED PROVIDER NOTE - PHYSICAL EXAMINATION
Gurdeep PLUMMER MD PGY1: PHYSICAL EXAM:    GENERAL: NAD, well-developed, uncomfortable  HEENT:  Atraumatic, Normocephalic, conjunctiva and sclera clear  CHEST/LUNG: Clear to auscultation bilaterally; No rhonchis, rales, or wheezing  HEART: Regular rate and rhythm; S1, S2; No murmurs, rubs, or gallops  ABDOMEN: RLQ tenderness with rebound, nondistended, normoactive bowel sounds  EXTREMITIES:  2+ Peripheral Pulses, No clubbing, cyanosis, or edema.  SKIN: No rashes or lesions  PSYCH: A&Ox3 Gurdeep PLUMMER MD PGY1: PHYSICAL EXAM:    GENERAL: NAD, well-developed, uncomfortable  HEENT:  Atraumatic, Normocephalic, conjunctiva and sclera clear  CHEST/LUNG: Clear to auscultation bilaterally; No rhonchis, rales, or wheezing  HEART: Regular rate and rhythm; S1, S2; No murmurs, rubs, or gallops  ABDOMEN: RLQ tenderness with rebound, nondistended, normoactive bowel sounds  Pelvic Exam: No adnexal tenderness, or cervical motion tenderness.  EXTREMITIES:  2+ Peripheral Pulses, No clubbing, cyanosis, or edema.  SKIN: No rashes or lesions  PSYCH: A&Ox3

## 2018-08-24 NOTE — ED PROVIDER NOTE - MEDICAL DECISION MAKING DETAILS
Gurdeep PLUMMER MD PGY1: 33 F no PMH p/w RLQ pain and tenderness with rebound c/f appendicitis less likely ovarian torsion or renal calculi given constant character of pain and presence of rebound tenderness. Will obtain CTAP IV and PO contrast to eval for appendicitis, UA for UTI, and CBC for WBC evaluation.

## 2018-08-24 NOTE — ED PROVIDER NOTE - PROGRESS NOTE DETAILS
Pt received as signout. Pending CT abd/pelvis read to r/out appy. Still endorsing pain that is slightly improved after toradol. Morphine ordered. MAYELIN: Pt CT reviewed by me, free fluid in pelvis, no appendicitis. Possible ruptured cyst? Will re-eval pt. SHARA CARLSON: Pt notes improvement in pain.  Presently mild.  Pt tolerating PO.  Repeat abdominal exam:  mild RLQ tenderness, no guarding, no rigidity, nondistended.  Pt requesting to go home.  Pt notes urinary frequency and urgency.  Pt medically stable for discharge.  Strict return precautions given. SHARA CARLSON:  Pt complaints of hives to left forearm after morphine.  Denies any throat tightness, shortness of breath, tongue swelling, wheezing.  Will give benadryl.  Pt agrees not to drive/operate heavy machinery since she was given morphine and will be given benadryl.  Pt's mother will drive pt.

## 2018-08-24 NOTE — ED ADULT NURSE NOTE - NSIMPLEMENTINTERV_GEN_ALL_ED
Implemented All Universal Safety Interventions:  [place] to call system. Call bell, personal items and telephone within reach. Instruct patient to call for assistance. Room bathroom lighting operational. Non-slip footwear when patient is off stretcher. Physically safe environment: no spills, clutter or unnecessary equipment. Stretcher in lowest position, wheels locked, appropriate side rails in place.

## 2018-08-24 NOTE — ED ADULT NURSE REASSESSMENT NOTE - NS ED NURSE REASSESS COMMENT FT1
20 gauge IV inserted in right ac. Repeat labs sent. Pt. is awaiting CT at present. Will continue to monitor.

## 2018-08-25 LAB — SPECIMEN SOURCE: SIGNIFICANT CHANGE UP

## 2018-08-26 LAB
-  AMIKACIN: SIGNIFICANT CHANGE UP
-  AMPICILLIN/SULBACTAM: SIGNIFICANT CHANGE UP
-  AMPICILLIN: SIGNIFICANT CHANGE UP
-  AZTREONAM: SIGNIFICANT CHANGE UP
-  CEFAZOLIN: SIGNIFICANT CHANGE UP
-  CEFEPIME: SIGNIFICANT CHANGE UP
-  CEFOXITIN: SIGNIFICANT CHANGE UP
-  CEFTAZIDIME: SIGNIFICANT CHANGE UP
-  CEFTRIAXONE: SIGNIFICANT CHANGE UP
-  CIPROFLOXACIN: SIGNIFICANT CHANGE UP
-  ERTAPENEM: SIGNIFICANT CHANGE UP
-  GENTAMICIN: SIGNIFICANT CHANGE UP
-  IMIPENEM: SIGNIFICANT CHANGE UP
-  LEVOFLOXACIN: SIGNIFICANT CHANGE UP
-  MEROPENEM: SIGNIFICANT CHANGE UP
-  NITROFURANTOIN: SIGNIFICANT CHANGE UP
-  PIPERACILLIN/TAZOBACTAM: SIGNIFICANT CHANGE UP
-  TIGECYCLINE: SIGNIFICANT CHANGE UP
-  TOBRAMYCIN: SIGNIFICANT CHANGE UP
-  TRIMETHOPRIM/SULFAMETHOXAZOLE: SIGNIFICANT CHANGE UP
BACTERIA UR CULT: SIGNIFICANT CHANGE UP
METHOD TYPE: SIGNIFICANT CHANGE UP
ORGANISM # SPEC MICROSCOPIC CNT: SIGNIFICANT CHANGE UP
ORGANISM # SPEC MICROSCOPIC CNT: SIGNIFICANT CHANGE UP

## 2019-01-14 NOTE — DISCHARGE NOTE OB - BREASTFEEDING PROVIDES MATERNAL HEALTH BENEFITS, DECREASED PREMENOPAUSAL BREAST CANCER, OVARIAN CANCER AND TYPE II DIABETES MELLITUS
on lantus 30 units, Humalog 12 units TID at home   will start on home dose insulin and sliding scale   follow HbA1c level in 1999   - C/w tacrolimus and prednisone 5 mg Statement Selected

## 2022-01-24 ENCOUNTER — EMERGENCY (EMERGENCY)
Facility: HOSPITAL | Age: 37
LOS: 1 days | Discharge: HOME | End: 2022-01-24
Attending: EMERGENCY MEDICINE | Admitting: EMERGENCY MEDICINE
Payer: COMMERCIAL

## 2022-01-24 VITALS
HEART RATE: 96 BPM | HEIGHT: 63 IN | TEMPERATURE: 98 F | OXYGEN SATURATION: 100 % | DIASTOLIC BLOOD PRESSURE: 74 MMHG | SYSTOLIC BLOOD PRESSURE: 107 MMHG | RESPIRATION RATE: 18 BRPM

## 2022-01-24 LAB
ALBUMIN SERPL ELPH-MCNC: 4.2 G/DL — SIGNIFICANT CHANGE UP (ref 3.3–5)
ALP SERPL-CCNC: 53 U/L — SIGNIFICANT CHANGE UP (ref 40–120)
ALT FLD-CCNC: 13 U/L — SIGNIFICANT CHANGE UP (ref 4–33)
ANION GAP SERPL CALC-SCNC: 9 MMOL/L — SIGNIFICANT CHANGE UP (ref 7–14)
APPEARANCE UR: CLEAR — SIGNIFICANT CHANGE UP
AST SERPL-CCNC: 15 U/L — SIGNIFICANT CHANGE UP (ref 4–32)
BASOPHILS # BLD AUTO: 0.05 K/UL — SIGNIFICANT CHANGE UP (ref 0–0.2)
BASOPHILS NFR BLD AUTO: 0.7 % — SIGNIFICANT CHANGE UP (ref 0–2)
BILIRUB SERPL-MCNC: 1.2 MG/DL — SIGNIFICANT CHANGE UP (ref 0.2–1.2)
BILIRUB UR-MCNC: NEGATIVE — SIGNIFICANT CHANGE UP
BLOOD GAS VENOUS COMPREHENSIVE RESULT: SIGNIFICANT CHANGE UP
BUN SERPL-MCNC: 13 MG/DL — SIGNIFICANT CHANGE UP (ref 7–23)
CALCIUM SERPL-MCNC: 9.2 MG/DL — SIGNIFICANT CHANGE UP (ref 8.4–10.5)
CHLORIDE SERPL-SCNC: 104 MMOL/L — SIGNIFICANT CHANGE UP (ref 98–107)
CO2 SERPL-SCNC: 24 MMOL/L — SIGNIFICANT CHANGE UP (ref 22–31)
COLOR SPEC: YELLOW — SIGNIFICANT CHANGE UP
CREAT SERPL-MCNC: 0.73 MG/DL — SIGNIFICANT CHANGE UP (ref 0.5–1.3)
DIFF PNL FLD: NEGATIVE — SIGNIFICANT CHANGE UP
EOSINOPHIL # BLD AUTO: 0.17 K/UL — SIGNIFICANT CHANGE UP (ref 0–0.5)
EOSINOPHIL NFR BLD AUTO: 2.2 % — SIGNIFICANT CHANGE UP (ref 0–6)
GLUCOSE SERPL-MCNC: 92 MG/DL — SIGNIFICANT CHANGE UP (ref 70–99)
GLUCOSE UR QL: NEGATIVE — SIGNIFICANT CHANGE UP
HCG SERPL-ACNC: <5 MIU/ML — SIGNIFICANT CHANGE UP
HCT VFR BLD CALC: 40.1 % — SIGNIFICANT CHANGE UP (ref 34.5–45)
HGB BLD-MCNC: 13.2 G/DL — SIGNIFICANT CHANGE UP (ref 11.5–15.5)
IANC: 4.45 K/UL — SIGNIFICANT CHANGE UP (ref 1.5–8.5)
IMM GRANULOCYTES NFR BLD AUTO: 0.3 % — SIGNIFICANT CHANGE UP (ref 0–1.5)
KETONES UR-MCNC: NEGATIVE — SIGNIFICANT CHANGE UP
LEUKOCYTE ESTERASE UR-ACNC: NEGATIVE — SIGNIFICANT CHANGE UP
LIDOCAIN IGE QN: 31 U/L — SIGNIFICANT CHANGE UP (ref 7–60)
LYMPHOCYTES # BLD AUTO: 2.41 K/UL — SIGNIFICANT CHANGE UP (ref 1–3.3)
LYMPHOCYTES # BLD AUTO: 31.5 % — SIGNIFICANT CHANGE UP (ref 13–44)
MCHC RBC-ENTMCNC: 30.8 PG — SIGNIFICANT CHANGE UP (ref 27–34)
MCHC RBC-ENTMCNC: 32.9 GM/DL — SIGNIFICANT CHANGE UP (ref 32–36)
MCV RBC AUTO: 93.7 FL — SIGNIFICANT CHANGE UP (ref 80–100)
MONOCYTES # BLD AUTO: 0.54 K/UL — SIGNIFICANT CHANGE UP (ref 0–0.9)
MONOCYTES NFR BLD AUTO: 7.1 % — SIGNIFICANT CHANGE UP (ref 2–14)
NEUTROPHILS # BLD AUTO: 4.45 K/UL — SIGNIFICANT CHANGE UP (ref 1.8–7.4)
NEUTROPHILS NFR BLD AUTO: 58.2 % — SIGNIFICANT CHANGE UP (ref 43–77)
NITRITE UR-MCNC: NEGATIVE — SIGNIFICANT CHANGE UP
NRBC # BLD: 0 /100 WBCS — SIGNIFICANT CHANGE UP
NRBC # FLD: 0 K/UL — SIGNIFICANT CHANGE UP
PH UR: 7 — SIGNIFICANT CHANGE UP (ref 5–8)
PLATELET # BLD AUTO: 229 K/UL — SIGNIFICANT CHANGE UP (ref 150–400)
POTASSIUM SERPL-MCNC: 3.4 MMOL/L — LOW (ref 3.5–5.3)
POTASSIUM SERPL-SCNC: 3.4 MMOL/L — LOW (ref 3.5–5.3)
PROT SERPL-MCNC: 6.8 G/DL — SIGNIFICANT CHANGE UP (ref 6–8.3)
PROT UR-MCNC: ABNORMAL
RBC # BLD: 4.28 M/UL — SIGNIFICANT CHANGE UP (ref 3.8–5.2)
RBC # FLD: 12.8 % — SIGNIFICANT CHANGE UP (ref 10.3–14.5)
SODIUM SERPL-SCNC: 137 MMOL/L — SIGNIFICANT CHANGE UP (ref 135–145)
SP GR SPEC: 1.02 — SIGNIFICANT CHANGE UP (ref 1–1.05)
UROBILINOGEN FLD QL: SIGNIFICANT CHANGE UP
WBC # BLD: 7.64 K/UL — SIGNIFICANT CHANGE UP (ref 3.8–10.5)
WBC # FLD AUTO: 7.64 K/UL — SIGNIFICANT CHANGE UP (ref 3.8–10.5)

## 2022-01-24 PROCEDURE — 74177 CT ABD & PELVIS W/CONTRAST: CPT | Mod: 26,MA

## 2022-01-24 PROCEDURE — 99285 EMERGENCY DEPT VISIT HI MDM: CPT

## 2022-01-24 PROCEDURE — 76830 TRANSVAGINAL US NON-OB: CPT | Mod: 26

## 2022-01-24 RX ORDER — ACETAMINOPHEN 500 MG
650 TABLET ORAL ONCE
Refills: 0 | Status: COMPLETED | OUTPATIENT
Start: 2022-01-24 | End: 2022-01-24

## 2022-01-24 RX ORDER — SODIUM CHLORIDE 9 MG/ML
1000 INJECTION INTRAMUSCULAR; INTRAVENOUS; SUBCUTANEOUS ONCE
Refills: 0 | Status: COMPLETED | OUTPATIENT
Start: 2022-01-24 | End: 2022-01-24

## 2022-01-24 RX ORDER — KETOROLAC TROMETHAMINE 30 MG/ML
15 SYRINGE (ML) INJECTION ONCE
Refills: 0 | Status: DISCONTINUED | OUTPATIENT
Start: 2022-01-24 | End: 2022-01-24

## 2022-01-24 RX ADMIN — Medication 650 MILLIGRAM(S): at 11:15

## 2022-01-24 RX ADMIN — SODIUM CHLORIDE 1000 MILLILITER(S): 9 INJECTION INTRAMUSCULAR; INTRAVENOUS; SUBCUTANEOUS at 11:16

## 2022-01-24 RX ADMIN — Medication 15 MILLIGRAM(S): at 11:15

## 2022-01-24 NOTE — ED PROVIDER NOTE - ATTENDING CONTRIBUTION TO CARE
I performed a history and physical exam of the patient and discussed their management with the resident and /or advanced care provider. I reviewed the resident and /or ACP's note and agree with the documented findings and plan of care. My medical decision making and observations are found above.  Lungs clear, abd soft but with significant lt sided tenderness.

## 2022-01-24 NOTE — ED PROVIDER NOTE - NSFOLLOWUPINSTRUCTIONS_ED_ALL_ED_FT
1. TAKE ALL MEDICATIONS AS DIRECTED.    2. FOR PAIN OR FEVER YOU CAN TAKE IBUPROFEN (MOTRIN, ADVIL) OR ACETAMINOPHEN (TYLENOL) AS NEEDED, AS DIRECTED ON PACKAGING.  3. FOLLOW UP WITH YOUR PRIMARY DOCTOR WITHIN 5 DAYS AS DIRECTED.  4. IF YOU HAD LABS OR IMAGING DONE, YOU WERE GIVEN COPIES OF ALL LABS AND/OR IMAGING RESULTS FROM YOUR ER VISIT--PLEASE TAKE THEM WITH YOU TO YOUR FOLLOW UP APPOINTMENTS.  5. IF NEEDED, CALL PATIENT ACCESS SERVICES AT [phone] TO FIND A PRIMARY CARE PHYSICIAN.  OR CALL [phone] TO MAKE AN APPOINTMENT WITH THE CLINIC.  6. RETURN TO THE ER FOR ANY WORSENING SYMPTOMS OR CONCERNS.     Abdominal Pain    Many things can cause abdominal pain. Many times, abdominal pain is not caused by a disease and will improve without treatment. Your health care provider will do a physical exam to determine if there is a dangerous cause of your pain; blood tests and imaging may help determine the cause of your pain. However, in many cases, no cause may be found and you may need further testing as an outpatient. Monitor your abdominal pain for any changes.     SEEK IMMEDIATE MEDICAL CARE IF YOU HAVE ANY OF THE FOLLOWING SYMPTOMS: worsening abdominal pain, uncontrollable vomiting, profuse diarrhea, inability to have bowel movements or pass gas, black or bloody stools, fever accompanying chest pain or back pain, or fainting. These symptoms may represent a serious problem that is an emergency. Do not wait to see if the symptoms will go away. Get medical help right away. Call [phone] and do not drive yourself to the hospital.         Uterine Fibroids    AMBULATORY CARE:    Uterine fibroids are growths found inside your uterus. Uterine fibroids also may be called tumors or leiomyomas. Uterine fibroids often appear in groups, or you may have only one. They can be small or large, and they can grow. They are almost always benign (not cancer) and likely will not spread to other parts of your body. They may grow when you are pregnant and shrink after you no longer have a monthly period.    Uterine Fibroid         Signs and symptoms of uterine fibroids: You may have no signs or symptoms. Symptoms depend on the size, type, and number of fibroids you have. Symptoms also depend on where the fibroids are inside your uterus:  •Heavy or painful menstrual bleeding      •Pelvic pressure and pain      •Increased pelvic pain during sex      •Constipation or pain when you have a bowel movement      •Need to urinate often      Seek care immediately if:   •Your heart begins to race, and you feel faint.      •You begin to pass large blood clots from your vagina.      Call your doctor or gynecologist if:   •Your symptoms, such as heavy bleeding, pain, or pelvic pressure, worsen.      •You feel weak and are more tired than usual.      •You do not feel like your bladder is empty after you urinate. You also may urinate small amounts more often.       •You have questions or concerns about your condition or care.      Treatment: You may not need treatment for your fibroids if you do not have symptoms. The following treatments may shrink your fibroids and help your pain:   •Hormones may help shrink your fibroids.      •Contraceptives help prevent pregnancy. They also may help shrink your fibroids.      •NSAIDs help decrease swelling and pain or fever. This medicine is available with or without a doctor's order. NSAIDs can cause stomach bleeding or kidney problems in certain people. If you take blood thinner medicine, always ask your healthcare provider if NSAIDs are safe for you. Always read the medicine label and follow directions.      •Surgery may be used to remove your uterine fibroids. Surgery may instead be used to block or slow the flow of blood to the fibroid. This may make your fibroids shrink or disappear. Surgery called a hysterectomy may be needed if your fibroids are severe. For this surgery, your healthcare provider removes your entire uterus. After this surgery, you will no longer be able to have children.      Prevent uterine fibroids:   •Maintain a healthy weight. Extra weight can cause fibroids to grow. Talk to your healthcare provider about a healthy weight for you. He or she can help you create a healthy weight loss plan if you are overweight.      •Eat a variety of healthy foods. Healthy foods include fruits, vegetables, lean meats, fish, low-fat dairy foods, cooked beans, and whole-grain breads and cereals. Fruits and vegetables are especially important for helping lower the risk for fibroids. Your healthcare provider or a dietitian can help you create a healthy meal plan.  Healthy Foods           •Limit or do not drink alcohol as directed. Alcohol can increase your risk for fibroids. A drink of alcohol is 12 ounces of beer, 1½ ounces of liquor, or 5 ounces of wine. Ask your healthcare provider for information if you need help to quit drinking alcohol.      Follow up with your doctor or gynecologist as directed: Write down your questions so you remember to ask them during your visits.       © Copyright [employer] 2022

## 2022-01-24 NOTE — ED PROVIDER NOTE - PROGRESS NOTE DETAILS
Pt states pain improved, discussed return precautions. Suggested PMD and OBGYN follow up. Matthew Lentz M.D. PGY-4

## 2022-01-24 NOTE — ED PROVIDER NOTE - NSICDXPASTMEDICALHX_GEN_ALL_CORE_FT
PAST MEDICAL HISTORY:  No pertinent past medical history       H/O Guillain-[place] syndrome     LE (lupus erythematosus)

## 2022-01-24 NOTE — ED PROVIDER NOTE - OBJECTIVE STATEMENT
36y F w/ reported hx of lupus presents w/ L lower abd pain for the last few days associated w/ increased bowel movements, chills and nausea. Reports going to PMD and having labs drawn, was told her bilirubin levels were elevated. 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 Denies vomiting or PSHx. States pain is worse when pushing on it, and when she presses on her belly, she feels pressure in her rectum. Denies pain w/ bowel movement.

## 2022-01-24 NOTE — ED PROVIDER NOTE - CLINICAL SUMMARY MEDICAL DECISION MAKING FREE TEXT BOX
Erica: 2 days of lt sided abd pain. Tender to palp. Patient with lupus, not on meds, no kidney involvement known, 15lb weight loss. Will get labs and imaging

## 2022-01-24 NOTE — ED ADULT TRIAGE NOTE - CHIEF COMPLAINT QUOTE
pt with co LLQ pain x 2 days. pt denies n/v/d. pt denies any vaginal discharge. pt also co itchiness to legs x one week. pt denies any rash or fever. Pt with HX of Lupus.

## 2022-01-24 NOTE — ED PROVIDER NOTE - PATIENT PORTAL LINK FT
You can access the FollowMyHealth Patient Portal offered by [facility] by registering at the following website: [email or web address]. By joining [employer]’s FollowMyHealth portal, you will also be able to view your health information using other applications (apps) compatible with our system.

## 2022-01-25 LAB
C TRACH RRNA SPEC QL NAA+PROBE: SIGNIFICANT CHANGE UP
CULTURE RESULTS: SIGNIFICANT CHANGE UP
N GONORRHOEA RRNA SPEC QL NAA+PROBE: SIGNIFICANT CHANGE UP
SPECIMEN SOURCE: SIGNIFICANT CHANGE UP

## 2022-01-26 ENCOUNTER — TRANSCRIPTION ENCOUNTER (OUTPATIENT)
Age: 37
End: 2022-01-26

## 2023-02-17 NOTE — PATIENT PROFILE OB - ALCOHOL USE HISTORY SINGLE SELECT
Medical Necessity Clause: This procedure was medically necessary because the lesions that were treated were: Detail Level: Detailed Include Z78.9 (Other Specified Conditions Influencing Health Status) As An Associated Diagnosis?: No Show Topical Anesthesia Variable?: Yes Spray Paint Text: The liquid nitrogen was applied to the skin utilizing a spray paint frosting technique. Post-Care Instructions: I reviewed with the patient in detail post-care instructions. Patient is to wear sunprotection, and avoid picking at any of the treated lesions. Pt may apply Vaseline to crusted or scabbing areas. Medical Necessity Information: It is in your best interest to select a reason for this procedure from the list below. All of these items fulfill various CMS LCD requirements except the new and changing color options. Consent: The patient's consent was obtained including but not limited to risks of crusting, scabbing, blistering, scarring, darker or lighter pigmentary change, recurrence, incomplete removal and infection. never

## 2024-01-17 NOTE — DISCHARGE NOTE OB - PRINCIPAL DIAGNOSIS
Comprehensive CCD (C-CDA v2.1)  
  
                          Created on: 2024  
  
  
SHERRIE LOZADA  
External Reference #: CDR,PersonID:[id number]  
: 1957  
Sex: Female  
  
Demographics  
  
  
                                        Address             [address] [place] [address]  [address]  
   
                                        Home Phone          [phone]  
   
                                        Home Phone          [phone]  
   
                                        Work Phone          [phone]  
   
                                        Work Phone          [phone]  
   
                                        Preferred Language  en  
   
                                        Marital Status      Single  
   
                                        Buddhism Affiliation Unknown  
   
                                        Race                White  
   
                                        Ethnic Group        Not  or Lati  
no  
  
  
Author  
  
  
                                        Name                Unknown  
   
                                        Address             [address]  
[address]  
[place]  [address]  
   
                                        Phone               [phone]  
   
                                        Organization        [facility]  
  
  
Care Team Providers  
  
  
                                Care Team Member Name Role            Phone  
   
                                Maki Veliz MD Primary Care Provider [phone]  
3-1991  
   
                                MAKI VELIZ  Referring       Unavailable  
   
                                MAKI VELIZ  Primary Care    Unavailable  
   
                                Maki Veliz MD Primary Care Provider [phone]  
3-1991  
   
                                Maki Veliz MD Primary Care Provider [phone]  
3-1991  
   
                                MAKI VELIZ  Primary Care    Unavailable  
   
                                REGGIE OCAMPO  Referring       Unavailable  
   
                                REGGIE OCAMPO  Referring       Unavailable  
   
                                MAKI VELIZ  Primary Care    Unavailable  
   
                                MAKI VELIZ  Primary Care    Unavailable  
   
                                MAKI VELIZ  Referring       Unavailable  
   
                                MAKI VELIZ  Primary Care    Unavailable  
   
                                SAMEER HAGAN. Referring       Unavailable  
   
                                MAKI VELIZ  Primary Care    Unavailable  
   
                                REGGIE OCAMPO  Referring       Unavailable  
   
                                HAGAN ., DR SAMEER MERLOS Admitting       Unavailable  
   
                                HAGAN ., DR SAMEER MERLOS Attending       Unavailable  
   
                                HOY ., DR GAMBINO Primary Care    Unavailable  
   
                                HAGAN ., DR SAMEER MERLOS Admitting       Unavailable  
   
                                HAGAN ., DR SAMEER MERLOS Attending       Unavailable  
   
                                HOY ., DR GAMBINO Primary Care    Unavailable  
   
                                HAGAN ., DR SAMEER MERLOS Consulting      Unavailable  
   
                                HAGAN ., DR SAMEER MERLOS Admitting       Unavailable  
   
                                HAGAN ., DR SAMEER MERLOS Attending       Unavailable  
   
                                MAG ., DR GAMBINO Primary Care    Unavailable  
   
                                HAGAN ., DR SAMEER MERLOS Consulting      Unavailable  
   
                                CHERIE ROBERT     Consulting      Unavailable  
   
                                GENNARO MOSS Consulting      Unavailable  
   
                                HAGAN ., DR SAMEER MERLOS Admitting       Unavailable  
   
                                HAGAN ., DR SAMEER MERLOS Attending       Unavailable  
   
                                HORAFAEL ., DR GAMBINO Primary Care    Unavailable  
   
                                OCAMPO ., MR REGGIE Admitting       Unavailable  
   
                                OCAMPO ., MR REGGIE Attending       Unavailable  
   
                                MAG ., DR GAMBINO Primary Care    Unavailable  
   
                                WEST, DR MARV ALLEN Consulting      Unavailable  
   
                                OCAMPO ., MR REGGIE Consulting      Unavailable  
   
                                HAGAN ., DR SAMEER MERLOS Admitting       Unavailable  
   
                                HAGAN ., DR SAMEER MERLOS Attending       Unavailable  
   
                                HOY ., DR GAMBINO Primary Care    Unavailable  
   
                                HAGAN ., DR SAMEER MERLOS Consulting      Unavailable  
   
                                CHERIE ROBERT     Consulting      Unavailable  
   
                                HAGAN ., DR SAMEER MERLOS Admitting       Unavailable  
   
                                HAGAN ., DR SAMEER MERLOS Attending       Unavailable  
   
                                HOY ., DR GAMBINO Primary Care    Unavailable  
   
                                MURRAY ., JAI   Consulting      Unavailable  
   
                                LAKSHMIPATHY ., NARENDRANATH Admitting       Ann Marie  
vailable  
   
                                LAKSHMIPATHY ., NARENDRANATH Attending       Ann Marie  
vailable  
   
                                HOY ., DR GAMBINO Primary Care    Unavailable  
   
                                LAKSHMIPATHY ., NARENDRANBENNY Consulting      Ann Marie  
vailable  
   
                                HAGAN ., DR SAMEER MERLOS Admitting       Unavailable  
   
                                HAGAN ., DR SAMEER MERLOS Attending       Unavailable  
   
                                HOY ., DR GAMBINO Primary Care    Unavailable  
   
                                HAGAN ., DR SAMEER MERLOS Consulting      Unavailable  
   
                                MURRAY ., JAI   Consulting      Unavailable  
   
                                LAKSHMIPATHY ., NARENDRANATH Admitting       Ann Marie  
vailable  
   
                                LAKSHMIPATHY ., NARENDKEDARATH Attending       Ann Marie  
vailable  
   
                                HOY ., DR GAMBINO Primary Care    Unavailable  
   
                                ELAYNE CHANEY Consulting      Unavailable  
   
                                LAKSHMIPATHY ., NARENDRANATH Consulting      Ann Marie  
vailable  
   
                                LAKSHMIPATHY ., NARENDRANATH Admitting       Ann Marie  
vailable  
   
                                LAKSHMIPATHY ., NARENDKEDARATH Attending       Ann Marie  
vailable  
   
                                HOY ., DR GAMBINO Primary Care    Unavailable  
   
                                TANISHA MEDELLIN Consulting      Unavailable  
   
                                LAKSHMIPATHY ., NARENDRANATH Consulting      Ann Marie  
vailable  
   
                                HOY ., DR GAMBINO Primary Care    Unavailable  
   
                                LAKSHMIPATHY ., NARENDRANATH Admitting       Ann Marie  
vailable  
   
                                LAKSHMIPATHY ., NARENDRANATH Attending       Ann Marie  
vailable  
   
                                LAKSHMIPATHY ., NARENDRANATH Consulting      Ann Marie  
vailable  
   
                                HAGAN ., DR SAMEER MERLOS Admitting       Unavailable  
   
                                HAGAN ., DR SAMEER MERLOS Attending       Unavailable  
   
                                HOY ., DR GAMBINO Primary Care    Unavailable  
   
                                MURRAY ., JAI   Consulting      Unavailable  
   
                                LAKSHMIPATHY ., NARENDRANATH Admitting       Ann Marie  
vailable  
   
                                LAKSHMIPATHY ., NARENDRANATH Attending       Ann Marie  
vailable  
   
                                HOY ., DR GAMBINO Primary Care    Unavailable  
   
                                LAKSHMIPATHY ., NARENDRANATH Admitting       Ann Marie  
vailable  
   
                                LAKSHMIPATHY ., NARENDRANATH Attending       Ann Marie  
vailable  
   
                                HOY ., DR GAMBINO Primary Care    Unavailable  
   
                                LAKSHMIPATHY ., NARENDRANATH Consulting      Ann Marie  
vailable  
   
                                HOY ., DR GAMBINO Admitting       Unavailable  
   
                                HOY ., DR GAMBINO Attending       Unavailable  
   
                                HOY ., DR GAMBINO Primary Care    Unavailable  
   
                                HOY ., DR GAMBINO Consulting      Unavailable  
   
                                HAGAN ., DR SAMEER MERLOS Admitting       Unavailable  
   
                                HAGAN ., DR SAMEER MERLOS Attending       Unavailable  
   
                                HOY ., DR GAMBINO Primary Care    Unavailable  
   
                                HAGAN ., DR SAMEER MERLOS Consulting      Unavailable  
   
                                ZAC MARS Consulting      Unavailable  
   
                                HOY ., DR GAMBINO Admitting       Unavailable  
   
                                HOY ., DR GAMBINO Attending       Unavailable  
   
                                HOY ., DR GAMBINO Primary Care    Unavailable  
   
                                HOY ., DR GAMBINO Consulting      Unavailable  
   
                                HAGAN ., DR SAMEER MERLOS Admitting       Unavailable  
   
                                HAGAN ., DR SAMEER MERLOS Attending       Unavailable  
   
                                HOY ., DR GAMBINO Primary Care    Unavailable  
   
                                MURRAY ., JAI   Consulting      Unavailable  
   
                                HOY ., DR GAMBINO Primary Care    Unavailable  
   
                                MEAGAN ., MAGALY Admitting       Unavailable  
   
                                MEAGAN ., MAGALY Attending       Unavailable  
   
                                LANE, DR ZAC WELLS Consulting      Unavailable  
   
                                MEAGAN ., MAGALY Consulting      Unavailable  
   
                                ALICIA MICHEL Consulting      Unavailable  
   
                                HOY ., DR GAMBINO Admitting       Unavailable  
   
                                HOY ., DR GAMBINO Attending       Unavailable  
   
                                HOY ., DR GAMBINO Primary Care    Unavailable  
   
                                HOY ., DR GAMBINO Consulting      Unavailable  
   
                                SANG COULTER    Consulting      Unavailable  
   
                                HAGAN ., DR SAMEER MERLOS Admitting       Unavailable  
   
                                HAGAN ., DR SAMEER MERLOS Attending       Unavailable  
   
                                HOY ., DR GAMBINO Primary Care    Unavailable  
   
                                MURRAY ., JAI   Consulting      Unavailable  
   
                                HOY ., DR GAMBINO Admitting       Unavailable  
   
                                HOY ., DR GAMBINO Attending       Unavailable  
   
                                HOY ., DR GAMBINO Primary Care    Unavailable  
   
                                HOY ., DR GAMBINO Consulting      Unavailable  
   
                                Jose Abraham Admitting       Unavail  
able  
   
                                MAKI VELIZ    Primary Care    Unavailable  
   
                                Jose Abraham Attending       Unavail  
able  
   
                                MAKI VELIZ    Primary Care    Unavailable  
   
                                Jose Abraham Attending       Unavail  
able  
   
                                Jose Abraham Admitting       Unavail  
able  
   
                                Carmita FAJARDO, Delmis Dowd Attending         
Unavailable  
   
                                JOSE ABRAHAM Attending       Unavailable  
   
                                Oh ALMANZAR Attending       Unavailable  
   
                                Maki Veliz    Referring       Unavailable  
  
  
  
Allergies  
  
  
                                                    Allergy   
Classification                          Reported   
Allergen(s)               Allergy Type              Date of   
Onset                     Reaction(s)               Facility  
   
                                                      
(6 sources)                             Acetaminophen /   
HYDROcodone               Drug Allergy                
1                         Itching                   [facility]  
Work Phone:   
[phone]  
1  
   
                                                      
(6 sources)                             Acetaminophen /   
oxyCODONE                 Drug Allergy                
1                         Itching                   [facility]  
Work Phone:   
[phone]  
1  
   
                                                      
(8 sources)                             Latex;   
Translations:   
[Latex]                                 Propensity to   
adverse   
reactions to   
drug                                      
1                                       Other (See   
Comments)                               [facility]  
   
                                                      
(8 sources)                             nickel sulfate;   
Translations:   
[Nickel]                  Drug Allergy                
4                                                   [facility]  
   
                                                      
(9 sources)                             Penicillins;   
Translations:   
[Penicillins]                           Propensity to   
adverse   
reactions to   
drug                                      
1                         Rash                      [facility]  
Work Phone:   
[phone]  
1  
   
                                                      
(3 sources)                             Acetaminophen /   
HYDROcodone;   
Translations:   
[Vicodin]                 Drug Allergy                
3                                                   [facility]   
Repository  
   
                                                      
(3 sources)                             Acetaminophen /   
oxyCODONE;   
Translations:   
[Percocet]                Drug Allergy                
3                                                   [facility]   
Repository  
   
                                                      
(2 sources)               Latex                     Drug allergy   
(disorder)                                
3                                                   [facility]   
Repository  
   
                                                      
(1 source)          Leucine             Drug Allergy        10-  
3                                                   [facility]   
Repository  
   
                                                      
(1 source)                              Penicillin;   
Translations:   
[penicillin]    Drug Allergy                                    [facility]   
Repository  
   
                                                      
(1 source)                              HYDROcodone;   
Translations:   
[HYDROcodone]   Drug Allergy                                    [facility]   
Repository  
  
  
  
Medications  
Current Medications  
  
  
  
                      Medication Drug Class(es) Dates      Sig (Normalized) Sig   
(Original)  
   
                                                    acetaminophen 300   
mg / codeine   
phosphate 30 mg   
oral tablet  
(6 sources)               Opioid Agonist            Start:   
2014                              take 1-2 tablets   
by mouth every   
four hours as   
needed                                  acetaminophen-code  
ine   
(TYLENOL/CODEINE   
#3) 300-30 MG per   
tablet   
Indications: Hip   
pain, bilateral   
Take 1-2 tablets   
by mouth every 4   
hours as needed.   
50 tablet 3   
2014 Active  
   
                                                    ALPRAZolam 0.5 mg   
oral tablet  
(6 sources)               Benzodiazepine            Start:   
2015                              take 1 tablet by   
mouth three times   
daily as needed   
for anxiety                             ALPRAZolam (XANAX)   
0.5 MG tablet   
Indications:   
Generalized   
anxiety disorder   
Take 1 tablet by   
mouth 3 times   
daily as needed   
for Sleep or   
Anxiety 90 tablet   
2 2015   
Active  
   
                                                    azithromycin 250 mg   
oral tablet  
(6 sources)                             Macrolide   
Antimicrobial                           Start:   
2018                                          azithromycin   
(ZITHROMAX) 250 MG   
tablet Take 1   
tablet by mouth   
daily Take 2   
tablets day 1 Take   
1 tablet days 2-5   
6 tablet 0   
2018 Active  
   
                                                    benazepril   
hydrochloride 10 mg   
oral tablet  
(6 sources)                             Angiotensin   
Converting Enzyme   
Inhibitor                                           take 2 tablets by   
mouth once daily                        benazepril   
(LOTENSIN) 10 MG   
tablet Take 20 mg   
by mouth daily. 0   
Active  
   
                                                    biotin 5 mg oral   
tablet  
(6 sources)                                                     Biotin 5000 MCG   
TABS Indications:   
Routine   
gynecological   
examination Take   
by mouth. 0 Active  
   
                                                    cinnamon bark 500   
mg oral capsule  
(6 sources)                                                 take 4 capsules   
by mouth once   
daily                                   Cinnamon (GNP   
CINNAMON) 500 MG   
CAPS Take 2,000 mg   
by mouth daily. 0   
Active  
   
                                                    COCONUT OIL PO  
(6 sources)                                                     COCONUT OIL PO   
Indications:   
Routine   
gynecological   
examination Take   
by mouth. 0 Active  
   
                                                    Fish Oils  
(6 sources)                                                     FISH OIL 2,400 m  
g   
by Does not apply   
route daily. 0   
Active  
   
                                                    24 hr metoprolol   
succinate 50 mg   
extended release   
oral tablet  
(6 sources)                             beta-Adrenergic   
Blocker                                             take 2 tablets by   
mouth once daily                        metoprolol   
(TOPROL-XL) 50 MG   
XL tablet Take 100   
mg by mouth daily.   
0 Active  
   
                                                    Multiple   
Vitamins-Minerals   
(HM MULTIVITAMIN   
ADULT GUMMY) CHEW  
(6 sources)                                                     Multiple   
Vitamins-Minerals   
(HM MULTIVITAMIN   
ADULT GUMMY) CHEW   
Indications:   
Routine   
gynecological   
examination Take   
by mouth. 0 Active  
   
                                                    pantoprazole 40 mg   
oral granules  
(6 sources)                             Proton Pump   
Inhibitor                                           take 40 mg by   
mouth once daily                        Pantoprazole   
Sodium (PROTONIX)   
40 MG PACK packet   
Take 40 mg by   
mouth daily. 0   
Active  
  
  
  
  
  
Problems  
Active Problems  
  
  
                                                    Problem   
Classification      Problem             Date                Documented   
Date                                    Episodic/Chronic  
   
                                                    Anxiety disorders  
(1 source)                              Anxiety disorder,   
unspecified;   
Translations: [ANXIETY   
DISORDER UNSPECIFIED]                   Onset:   
  
3                                                   Chronic  
   
                                                    Congestive heart   
failure;   
nonhypertensive  
(1 source)                              Unspecified diastolic   
(congestive) heart   
failure; Translations:   
[UNSPECIFIED DIASTOLIC   
HEART FAILURE]                          Onset:   
  
2                                                   Chronic  
   
                                                    Diabetes mellitus   
without complication  
(1 source)                              Type 2 diabetes mellitus   
without complications;   
Translations: [TYPE 2 DM   
WITHOUT COMPLICATIONS]                  Onset:   
  
2                                                   Chronic  
   
                                                    Diseases of white   
blood cells  
(4 sources)                             Elevated white blood cell   
count, unspecified;   
Translations: [ELEVATED   
WHITE BLOOD CELL COUNT   
UNS]                                    Onset:   
  
2                                                   Chronic  
   
                                                    Disorders of lipid   
metabolism  
(1 source)                              Pure   
hypercholesterolemia,   
unspecified;   
Translations: [PURE   
HYPERCHOLESTEROLEMIA   
UNSPEC]                                 Onset:   
  
3                                                   Chronic  
   
                                                    Esophageal disorders  
(1 source)                              Gastro-esophageal reflux   
disease without   
esophagitis;   
Translations: [GERD   
WITHOUT ESOPHAGITIS]                    Onset:   
  
3                                                   Chronic  
   
                                                    Essential   
hypertension  
(1 source)                              Essential (primary)   
hypertension;   
Translations: [ESSENTIAL   
PRIMARY HYPERTENSION]                   Onset:   
  
3                                                   Chronic  
   
                                                    Fluid and   
electrolyte   
disorders  
(1 source)                              Dehydration;   
Translations:   
[DEHYDRATION]                           Onset:   
  
3                                                   Episodic  
   
                                                    Hypertension with   
complications and   
secondary   
hypertension  
(1 source)                              Hypertensive heart   
disease with heart   
failure; Translations:   
[HTN HEART DISEASE   
W/HEART FAIL]                           Onset:   
  
2                                                   Chronic  
   
                                                    Mood disorders  
(1 source)                              Mood disorders;   
Translations: [DEPRESSION   
UNSPECIFIED]                            Onset:   
  
3                                                     
   
                                                    Other aftercare  
(1 source)                              Long term (current) use   
of aspirin; Translations:   
[LONG TERM CURRENT USE OF   
ASPIRIN]                                Onset:   
  
3                                                   Episodic  
   
                                                    Other aftercare  
(1 source)                              Other long term (current)   
drug therapy;   
Translations: [OTH LONG   
TERM CURRENT DRUG   
THERAPY]                                Onset:   
  
3                                                   Episodic  
   
                                                    Other aftercare  
(1 source)                              Long term (current) use   
of oral hypoglycemic   
drugs; Translations:   
[LONG TERM USE ORAL   
HYPOGLYCEMIC DX]                        Onset:   
  
3                                                   Episodic  
   
                                                    Other connective   
tissue disease  
(1 source)                              Presence of right   
artificial hip joint;   
Translations: [PRESENCE   
RIGHT ARTIFICIAL HIP   
JOINT]                                  Onset:   
  
3                                                   Chronic  
   
                                                    Other connective   
tissue disease  
(4 sources)                             Neuralgia and neuritis,   
unspecified;   
Translations: [NEURALGIA   
AND NEURITIS UNSPECIFIED]               Onset:   
  
3                                                   Episodic  
   
                                                    Other connective   
tissue disease  
(4 sources)                             Bicipital tendinitis,   
left shoulder;   
Translations: [BICIPITAL   
TENDINITIS LEFT SHOULDER]               Onset:   
  
3                                                   Episodic  
   
                                                    Other connective   
tissue disease  
(1 source)                              Bicipital tendinitis,   
right shoulder;   
Translations: [BICIPITAL   
TENDINITIS RIGHT   
SHOULDER]                               Onset:   
  
3                                                   Episodic  
   
                                                    Other nervous system   
disorders  
(4 sources)                             Other specified   
mononeuropathies of right   
lower limb; Translations:   
[OTH SPEC MONONEUROPATH   
RT LOW LIMB]                            Onset:   
  
3                                                   Chronic  
   
                                                    Other nervous system   
disorders  
(2 sources)                             Other chronic pain;   
Translations: [OTHER   
CHRONIC PAIN]                           Onset:   
  
2                                                   Chronic  
   
                                                    Other nervous system   
disorders  
(1 source)                              Other specified   
mononeuropathies;   
Translations: [OTHER   
SPECIFIED   
MONONEUROPATHIES]                       Onset:   
04-  
3                                                   Chronic  
   
                                                    Other nervous system   
disorders  
(1 source)                              Chronic pain syndrome;   
Translations: [CHRONIC   
PAIN SYNDROME]                          Onset:   
  
3                                                   Chronic  
   
                                                    Other non-traumatic   
joint disorders  
(3 sources)                             Pain in right hip;   
Translations: [Pain in   
right hip]                              Onset:   
  
2                                                   Episodic  
   
                                                    Other non-traumatic   
joint disorders  
(4 sources)                             Pain in left hip;   
Translations: [PAIN IN   
LEFT HIP]                               Onset:   
  
3                                                   Episodic  
   
                                                    Other nutritional;   
endocrine; and   
metabolic disorders  
(1 source)                              Obesity, unspecified;   
Translations: [OBESITY   
UNSPECIFIED]                            Onset:   
  
3                                                   Chronic  
   
                                                    Other nutritional;   
endocrine; and   
metabolic disorders  
(1 source)                              Body mass index (BMI)   
40.0-44.9, adult;   
Translations: [BODY MASS   
INDEX BMI 40.0-44.9   
ADULT]                                  Onset:   
  
3                                                   Chronic  
   
                                                    Other screening for   
suspected conditions   
(not mental   
disorders or   
infectious disease)  
(4 sources)                             Abnormal results of   
kidney function studies;   
Translations: [ABNORM   
RESULTS KIDNEY FUNCTION   
STDY]                                   Onset:   
  
3                                                   Episodic  
   
                                                    Spondylosis;   
intervertebral disc   
disorders; other   
back problems  
(7 sources)                             Other intervertebral disc   
degeneration, lumbar   
region; Translations:   
[Spondylosis without   
myelopathy or   
radiculopathy, lumbar   
region]                                 Onset:   
  
2                                                   Chronic  
   
                                                    Spondylosis;   
intervertebral disc   
disorders; other   
back problems  
(9 sources)                             Sacrococcygeal disorders,   
not elsewhere classified;   
Translations: [Spinal   
stenosis, site   
unspecified]                            Onset:   
10-  
2                                                   Episodic  
   
                                                    Syncope  
(4 sources)                             Syncope and collapse;   
Translations: [SYNCOPE   
AND COLLAPSE]                           Onset:   
  
3                                                   Episodic  
   
                                                    Unclassified  
(4 sources)                             LOW BACK PAIN,   
UNSPECIFIED;   
Translations: [LOW BACK   
PAIN, UNSPECIFIED]                      Onset:   
  
2                                                     
   
                                                    Urinary tract   
infections  
(1 source)                              Urinary tract infection,   
site not specified;   
Translations: [UTI SITE   
NOT SPECIFIED]                          Onset:   
  
3                                                   Episodic  
  
  
Past or Other Problems  
  
  
                      Problem Classification Problem    Date       Documented Da  
te Episodic/Chronic  
   
                                                    Deficiency and other   
anemia  
(1 source)                              Anemia,   
unspecified;   
Translations:   
[ANEMIA   
UNSPECIFIED]                            Onset:   
2022                                          Episodic  
   
                                                    Malaise and fatigue  
(1 source)                              Other fatigue;   
Translations:   
[OTHER FATIGUE]                         Onset:   
2022                                          Episodic  
   
                                                    Other connective tissue   
disease  
(1 source)                              Other muscle   
spasm;   
Translations:   
[OTHER MUSCLE   
SPASM]                                  Onset:   
2022                                          Episodic  
   
                                                    Unclassified  
(1 source)                              LOW BACK PAIN,   
UNSPECIFIED;   
Translations:   
[LOW BACK PAIN,   
UNSPECIFIED]                            Onset:   
2023                                            
  
  
  
Results  
  
  
                          Test Name    Value        Interpretation Reference   
Range                                   Facility  
   
                                                    Physician Referralon   
024   
   
                                        Physician Referral  [id number]  
10  
[id number]#1.0  
0TIFF               [facility]  
   
                                        Physician Referral  [phone]  
10  
[id number]#1.0  
0TIFF               [facility]  
   
                                                    Coding Queryon 2023   
   
                                        Coding Query        [id number]  
06  
[id number]#1.00  
[facility]  
   
                                                    MAGR Postoperative Recordon   
2023   
   
                                                    MAGR Postoperative   
Record                                  MAGR Phase II Record   
Summary  
Primary Physician:   
Jose Abraham DO  
Case Number:   
[id number]  
Finalized Date/Time:   
23 07:41:18  
Pt. Name: SHERRIE LOZADA/Sex: 1957   
FEMALE  
Med Rec #: [id number]  
Physician: Jose Abraham DO  
Financial #: [id number]  
Pt. Type: O  
Room/Bed: [facility]  
Admit/Disch: 23   
08:33:12 -  
23 15:10:00  
Institution:  
Phase II Case Times   
MAGR  
Pre-Care Text:  
Patient is free from   
s/s of injury. Patient   
remains free from   
compromised physical   
state related to   
surgery or  
anesthesia. Patient   
comfort maintained.   
Patient/family   
verbalize   
understanding of   
discharge   
instructions.  
Entry 1  
In PACU II 23   
14:15:00 Discharge   
from PACU 23   
15:00:00  
II  
Last Modified By:   
Shari Storey RN   
23  
07:41:16  
Post-Care Text:  
The patient remains   
free from s/s of   
injury. Patient's   
vital signs stable,   
circulation   
maintained, return to  
preop mental and   
physical status,   
opsite/dressing   
intact, minimal or   
absent nausea and   
vomiting, tolerates po  
intake. Patient   
verbalizes adequate   
pain control.   
Patient/family express   
understanding of   
discharge  
instructions.  
General Comments:  
2 [facility] PHASE II  
Finalized By: [place],   
Shari RN  
Document Signatures  
Signed By:  
Shari Storey RN   
23 07:41      [facility]  
   
                                                    Coding Summaryon 2023   
   
                                        Coding Summary      HTMLBase 64   
GlomhhhjCIp5bLw+PGhlYW  
Q+KD3SXWWiF08vaTZnsK6n  
H1UMWXlMNfdbJKWLBVdIGp  
DhqbTrDZ1hdOOtJETo  
IC8+UZ5rFVApEnzhhZElf3  
A5vND0T13aoa5jPJkphTT4  
FCFyXeQczwelo8fpwHr1IX  
cuNmluOyBt  
JBJxcP20ILG0gX12Pc18yU  
PmrQUpd4xfaJd1FnCbSBZe  
RAC2kPvlDUuhw5EfHRXyC3  
1fnOJpk7X5  
QIQbgMmqoMEePdQzbQW6aL  
8eZKcoyabxy4dlkhhqFbd1  
xb37hOJmc9S0wMV8G3Dvzp  
S6QVJbbYDv  
KilivIMGpB1iisrtr6lcmc  
ekDlXzAMQrTJa2SJz1ZCHh  
cNamPjXoQU24TOZ1SDPuwj  
NrV5CvAUBy  
tSyeWxX7u5L9Hl9GS6QDIa  
hxK7FQVRKMMEehgOP+PC90  
uk36Q9NtDjtiAac9SOYpQK  
M3aFP5nU5t  
QNDoZJovy5C6pQH4F6Lrsd  
Qpkk2go0peFPAbEHysX40q  
oBYes7J2WXKwhZF9CBJzkF  
aeZcKnkL96  
Oyc+DSLujTtto3ArDluyi1  
dbd0vzeFu1XgkhIYVdmdNp  
jYjrDBQ3x1IrWw3uHWFooY  
G9nYM8sC2m  
HeOmSlL8TPqgC854NpClzX  
KwKltdC27gS3BplLK+PHRy  
Vjo7QOJbcPomNO1iD8IfQC  
RpbmctbGVm  
cUipGC4sNBAvumldZACtiN  
2jDCEcW8h2OoEpEdI5HQuc  
Y1PtKYRqxukoHq13rR4cOh  
TeMrM3SMrt  
Q6BhomA4GYYxiLOvIJxrXE  
L2G51hf8W1BXBfQCYrQHW9  
jQP1kF0dpDixcpkngDVevD  
sgdmVydGlj  
PVveCTglT770LYLbcTpwJr  
NvZGluZyBEYXRlOiAgMDkv  
MjAvMjAyMzwvdGQ+PHRkIH  
W1cOgtPAWl  
yHNuFPdfGo4zlJnbcXokHM  
9iTESkbkqwFYTjdH5mRQNt  
uCXxiUbhCE1fIYCisjiah6  
92PqVyMCT2  
JVLoaOOyY3FxmI5hJlVmKY  
XrCCSwI1AzcMWyEYkqP423  
MQtuRaD3XYOyvxAqI3WpAM  
FsaWduOiB0  
h0N7Ll9Ak3NsnqprF0NjyV  
ClBtLaSccnHFh2I8YgRyea  
dHI+EM95PODqSI55QLh1YZ  
Y7hWtaYKrr  
UTWiA4TswF4aXoKjHRDoAD  
RkOyc+PHRhYmxlIHdpZHRo  
VZaiSBWkMhQtuEntCO6zXb  
9yZGVyLWNv  
qCbnfULwIdSgx1dtWHGpEJ  
wpYT5axKmbA2DbaJD1TYPv  
t3k4Nj74G03gG2JpiEH+PG  
KynTX4eFZ2  
qQ0rDfHjQrR3GCdwZ191Yp  
OlrSQkQedsm6yxq6ouhOw1  
VsH0TZLqnwGkyKjvOYZ5j4  
BwWd57D47t  
IHdpZHRoPSIxNSUiIHZhbG  
msua3xuD5dBa3+PGNvbCB3  
pAF9qD9yVyFbByU7RXhnH7  
49InRvcCIv  
Zezwj6qtn2yfcEb8NoSvPQ  
ZjgcJocKtfXXZ9i0YqZm65  
U2HdeSkjt8NoJcc6dg37mD  
Eum3F8cNY9  
B1WbBVYhozmbkCOghVouKX  
5gDDXoaoboPGRczW4xONRb  
S7a4YmQxSsS9JEdcD2Yfkx  
B5YZLzeAMn  
ZCSyaZRUrQ6tjllde3cnpo  
dsCxGeVZGrMCa4YWg1MNWr  
wMarHsHeKRE2EnP0ZRX2pV  
PywI2icQdy  
vcltgO6jPmg+FQV9oEAdsE  
DPEQ0nSkhxbCB+PHRkIHN0  
jKymCIogGIAsxC3eXOLeH6  
t1XlJaVfX4  
OIddF8XeezS6VQLkmSExWE  
KcbOMPeN5rkxqno0hyccwk  
VhEgLVGhJHl3JCt5KHInjX  
duOiBsZWZ0  
YiU7CAJ1yBMoxE8xeDgurd  
jygC6lEaq+QmlydGggRGF0  
QBe8T3KwNqt2RDFxpFmcNS  
0ncGFkZGlu  
Fn1rqYkkiWbcPC9pYOTdaj  
yfc786BoIsr0idYIBraSTx  
BPraNJE8S10ng8T5WQXkGH  
TfRFF6wCD5  
pE0mtZgrybloeEVvkHrrfk  
QuzCaaVAmhHNfvX188IUKp  
tOwuUrOfLKt7J5XvRje9TN  
KlxIjyTA8s  
rYZfSFtdEa0uhRgmbLigSL  
6pYCWsbslde653FmYyh1wl  
REJkxTRsRQcnTTC0L27tn9  
E3RWCmUPVw  
YFB0hEE3gE6mcWoxozfnoP  
VmdDsgdmVydGljYWwtYWxp  
J654VGHkjVcdDtDfpMh6I5  
AbOue9UWVg  
eFqpZQ5wjBMuONulHp1fvF  
rllOcfYW4tPJKxgjwrn717  
EpGyd3dcKIHacDDzXNxcOB  
E2P18jh9W4  
EJHmHPHpITP0uOF8nE6ilW  
lnbjogbGVmdDsgdmVydGlj  
RFczPPooI236TLNthNdfHa  
BhdGllbnQg  
UExgVOu6M5NcHcmwmXS+PC  
23IHJgXN57iWRimDSsd8zp  
rTs2IyTqJZXyYGF8tNyxAN  
pap6PvFAVw  
F63ghTBvr7B1HQIsmWjowC  
LtSxQolUH3nT4iHJatavlp  
c1tqqgasKleuy0tvci60kP  
73M80pSVdv  
ZHRoPSIzMCUiIHZhbGlnbj  
1wdC7vZl3+DNBzoIY4eJX2  
lT7mEMHzSzO8UUlzU498Uw  
RvcCIvPjxj  
h9mlz0yvvCo5ZvU0GESvbp  
IrhDylTZT9o2AeMf46D50e  
IHdpZHRoPSIyMCUiIHZhbG  
sdnd8qpP1d  
Ii8+PQZlzLS2gED0sT2rBc  
EqOxP9VGhgI683CjAxgRBb  
JxsnK64dY1RlbAZ+PHRyPj  
l8QMOfxAsb  
RK8ukVFtZYrtGw8jZZX4Fj  
UvZkJuECheT2PwLSKesidx  
qohkmPX2AMJoBQEpfE61Mo  
9udDogMTBw  
xWEBbM5fqzwrz2wkxzgqRy  
FtOWItTGu7ORi1BFZuyReu  
OnZdZUY3PqW5MGN0gHVulR  
1hbGlnbjog  
iX7lS8XbUWRbzprnRh48rW  
7nTlGfReE8BQjoYys+Sk9O  
NWYlLBWBBk3KRSzfECqXBr  
wvdGQ+PHRk  
VVZ3vErzGEqhYVBpyS2nMI  
UyI0a3VtCyYuH8SFyhF8Rt  
TBAobnakJu58iA2bZvWwIt  
C2KRveY8Du  
kvH5TFMlpLTeFDclUKX9E3  
0qt3A3CEEdHJVrPDB8cPV8  
dR1sgJxbufcxhQFvaYvxec  
VydGljYWwt  
WLabX313SUUpzKwjNuHiWb  
H8QkR7IEr2P6TaTyn1AESb  
jSawJH5isAMfQOynOj1uoO  
vrfEsxFD8x  
YBPzmjawVGGnzH2xXPZdxD  
PpaZavFF8zPXOypszte415  
RtVcWRP3YLWhhXNbE9UuwZ  
9yOiAjMDAw  
RDJrI6NanFVfSWlgN781IQ  
lxGiT4MRXgkdYwK7FuYOYp  
wShsHjF3k1Q6Mq64VUIYOV  
FyczwvdGQ+  
LZHeLLE1kKgrIYhlFTTtgT  
9gXRBeO6n5QkDoAfA6UJse  
C5IzBNKvddpfDb37dW1uRc  
AdVmA6GRov  
H5AtiuL2HJKkoSJlATqlIX  
I7N43au2Y4EIOyITQkDLA7  
vJY9kN4neBwdupyakNYwoF  
sgdmVydGlj  
GWzdWAemQ120TWDxbChwRr  
ZFTUFMRTwvdGQ+PHRkIHN0  
mEbpHQbuAHKxeF7eULTgK4  
w3RcGmFdN2  
NRzuO9MgMSZwridsOo28iU  
8pWsYgTdT7XAsbY5MntvL7  
QIOibMZxQBbzTOZ3A81sq5  
R6VCHpKRNa  
VKL6vZO2lY0meOjbiqjfdG  
VmdDsgdmVydGljYWwtYWxp  
W465PJHwlByuGm2rh8Zlbo  
H3tO7dBK28  
YC39Q0TgXylbcYKnxCQ+PH  
RhYmxlIHdpZHRoPScxMDAl  
AkAtiBjtUG6mUw2uZWQoFC  
NvbGxhcHNl  
RpJbf1nbRXGnZAzwCD3qtB  
wjP4BzwEZ5FZRbr9q8Ya01  
K79bG7WqaTQ+ZWXcnNF0hM  
Z7mZ9nOxLm  
YjF2CUizD019VkAwiHHlKw  
psb3tco2oahGo5JlLyLSOw  
dvYstQmhNLC9y1RrMy11B3  
9sIHdpZHRo  
SMWqQQJcBRGrnJbkbv0vhO  
9wIi8+IKUlfXU7lVH6gE1c  
LrHnEoD3NWisI266NmQdkY  
YoXqorU70h  
Z8OzwBX+RXSxEer3OMNvjW  
dzIC7agETpXAscCc2fYIG7  
UuDqKbZlVIytP7EeZXWinb  
ctcmlnaHQ6  
MONcKNBcoW04We1boFtvEw  
5wTOZcNAO1DUZmlMDhR9Fp  
dG4gDhVxEQQqTLElO1NanI  
UnAZopH750  
MCfxOyO1JMSheuSyX7QrNV  
OekEzzEwA8w0A6Ao0GuPqz  
cOIwBF2cJiEgKRs9S9KzJz  
c7MLZryAxo  
HM2vfFBeRTwwJy3feRzpxW  
lgTY4bXMYthxuqt273NaIt  
h8xqLFQrfNYrKKwjUYT1E4  
9hu3F6YZZu  
NMStNCM8jMQ4nE0olFysju  
ogbGVmdDsgdmVydGljYWwt  
CAzfK228STFnuIalOcIVVe  
l0F1NtCjy6  
MUZorObyXL7ldWLkUMlqUt  
8dqQuexCdoTU8hRAZgppou  
l723RaQto9plXMZmsWKhLQ  
itHIU4T31u  
s0K2EAOsJJSeXRP1eIG5oG  
1hbGlnbjogbGVmdDsgdmVy  
cCwsUIbcMRelP280DVMuoE  
fhAz6LMch9  
W5KkGur2NFSzbQljXD5jwH  
TrSRtnZy4uoAtkbEtzOB7n  
TGKicqqyb002XiMet7hjTE  
EwcHQgVGlt  
TVN5O91nx2V0TETvZRLtOB  
C4kWS9dP5foStmppeezBXs  
dDsgdmVydGljYWwtYWxpZ2  
46IHRvcDsn  
PlBheWVyOjwvdGQ+PC90cj  
38W4WnKedvSpe5WTBvZOW8  
kBG4qD7lDSUjEKixf5F1oX  
C5V0VjsaXp  
ci1 (more content not   
included)...        [facility]  
   
                                                    Consent Formson 2023   
   
                                        Consent Forms       [id number]  
[id number]#1.00  
[facility]  
   
                                                    Discharge Instructionson    
   
                                                    Discharge   
Instructions                            [id number]  
[facility]  
   
                                                    Outside Recordson 2023  
   
   
                                        Outside Records     [id number]  
[id number]#1.00  
[facility]  
   
                                                    Provider Orderson 2023  
   
   
                                        Provider Orders     [id number]  
[id number]#1.00  
[facility]  
   
                                                    Telemetry Stripson 09-  
3   
   
                                        Telemetry Strips    [id number]  
90  
[id number]#1.0  
0[facility]  
   
                                                    Consultation/Specialist Note  
on 2023   
   
                                                    Consultation/Specia  
list Note                               Patient: SHERRIE LOZADA MRN:   
[id number] FIN: [id number]  
Age: 65 years Sex:   
FEMALE : 1957  
Associated Diagnoses:   
None  
Author: NIKOLAI SOTO  
Basic Information  
1 day s/p LT reverse   
TSA (DOS 23)  
Subjective  
pt doing well, she   
notes she is looking   
forward to going home   
today, pain is ok at   
this point, she notes   
she is still feeling   
the block at this   
point.  
Review of Systems  
denies nausea  
Health Status  
Allergies:  
Allergic Reactions   
(All)  
Moderate  
Latex- Blister.  
Percocet- Itching.  
Vicodin- Itching.  
Mild  
Nickel- Rash.  
Penicillin- Rash.  
Objective  
dressing the left   
shoulder is clean dry   
and intact, kryo cuff   
intact, arm sling on,   
able to make a fist,   
flex and extend the   
wrist and has a little   
flexion and extension   
of the elbow, nv   
intact distally  
Impression and Plan  
d/c home today, f/U in   
office 7-10 days s/p   
surgery  
[Electronically Signed   
on: 2023 08:48   
EDT]  
______________________  
__________________  
SUMINIKOLAI OLEARY   
[Verified on:   
2023 08:48 EDT]  
______________________  
__________________  
NIKOLAI SOTO       [facility]  
   
                                                    Discharge Noteon 2023   
   
                                        Discharge Note      discharge instructio  
ns   
reviewed with patient   
and daughter,   
belongings packed,   
skin wam and dry, foam   
dressing dry, polar   
care sent home with   
patient, denies pain,   
awaiting wheelchair to   
private vehicle.  
[Electronically Signed   
on: 2023 15:08   
EDT]  
______________________  
__________________  
Pia Carlson RN   
[Verified on:   
2023 15:08 EDT]  
______________________  
__________________  
Pia Carlson RN       [facility]  
   
                                        Discharge Note      patient refusing   
discharge instructions   
at this time, patient   
is choosing to wait   
for daughter to be her   
for instructions, when   
daughter arrives will   
review discharge   
instructions.  
[Electronically Signed   
on: 2023 13:57   
EDT]  
______________________  
__________________  
Pia Carlson RN   
[Verified on:   
2023 13:57 EDT]  
______________________  
__________________  
Pia Carlson RN       Normal                                  [facility]  
   
                                                    Inpatient Patient Summaryon   
2023   
   
                                                    Inpatient Patient   
Summary                                 [address] [address]  
[phone]  
Patient Discharge   
Instructions  
Name: SHERRIE LOZADA  
: 1957 MRN:   
[id number] FIN: [id number]  
Patient Address: [address] [place] [address]  
Primary Care Provider:  
Name: MAKI VELIZ  
Phone: [phone]  
After you are   
discharged if you find   
you have any   
questions, please,   
call [phone] ext   
[phone] to speak to a   
nurse.  
The Pharmacy at   
[facility] is open   
Monday through Friday   
from 9A to 6P and   
Saturday and    
from 9A to 5P  
Discharge Diagnosis:   
Arthritis of left   
glenohumeral joint  
Prescription   
Information: If you   
have been given a   
prescription for   
narcotics, seek   
immediate medical   
attention if you have   
any difficulty   
breathing or any   
sudden status changes   
such as confusion and   
sleepiness.  
If you or anyone you   
know is experiencing   
suicidal thoughts,   
mental health, alcohol   
and/or drug addiction   
problems; contact the   
[facility]   
Recovery [facility]    
Crisis Hotline   
1-735.306.8630 -Text   
0WRZE tj 637105.  
If you received any   
narcotics, sedation,   
or any other   
medication that causes   
drowsiness for the   
next 24 hours, unless   
otherwise directed:  
? Do not drive a car.  
? Do not operate   
machinery such as   
power tools, lawn   
mowers, drills, sewing   
machines, or stoves  
? Avoid alcoholic   
beverages and drugs   
for allergies, nerves,   
or sleep  
? Do not make   
important personal or   
business decisions or   
sign any legal   
documents  
[facility]   
would like to thank   
you for allowing us to   
assist you with your   
healthcare needs. The   
following includes   
patient education   
materials and   
information regarding   
your injury/illness.  
SHERRIE LOZADA   
has been given the   
following list of   
follow-up   
instructions,   
prescriptions, and   
patient education   
materials:  
Follow-up Instructions  
With: Address: When:  
Jose Abraham [address] [place] [address],   
[address] [place], [place]   
[id number] [phone]   
Business (1)   
2023 10:45 AM  
Medications  
During the course of   
your visit, your   
medication list was   
updated with the most   
current information.   
The details of those   
changes are reflected   
below:  
Medications That Were   
Updated - Follow Below   
Instructions  
Other Medications  
Updated: cranberry   
(Cranberry oral   
capsule) 1 tab(s) Oral   
2 times a day.  
Medications to   
Continue That Have Not   
Changed  
Other Medications  
ascorbic acid (Vitamin   
C 1000 mg oral tablet)   
3 tab(s) Oral every   
day.  
aspirin (aspirin 81 mg   
oral delayed release   
tablet) 1 tab(s) Oral   
every day.  
baclofen (baclofen 10   
mg oral tablet) 1   
tab(s) Oral every day.  
biotin (biotin 5000   
mcg oral capsule) 1   
cap(s) Oral every day.  
cholecalciferol   
(Vitamin D3 1000 intl   
units oral tablet) 1   
tab(s) Oral 2 times a   
day.  
garlic (Garlic Oil   
oral capsule) 1 cap(s)   
Oral 2 times a day.  
hydroCHLOROthiazide   
(hydroCHLOROthiazide   
25 mg oral tablet) 1   
tab(s) Oral every day.  
liothyronine   
(liothyronine 5 mcg   
oral tablet) 1 tab(s)   
Oral every day.  
losartan (losartan 25   
mg oral tablet) 1   
tab(s) Oral every day.  
magnesium gluconate   
(magnesium gluconate   
250 mg oral tablet) 1   
tab(s) Oral every day.  
metFORMIN (metFORMIN   
500 mg oral tablet) 1   
tab(s) Oral 2 times a   
day.  
metoprolol (metoprolol   
tartrate 25 mg oral   
tablet) 1 tab(s) Oral   
2 times a day.  
omega-3   
polyunsaturated fatty   
acids (Fish Oil 1000   
mg oral capsule) 1   
cap(s) Oral 2 times a   
day.  
pantoprazole   
(pantoprazole 40 mg   
oral delayed release   
tablet) 1 tab(s) Oral   
every day.  
simvastatin   
(simvastatin 20 mg   
oral tablet) 1 tab(s)   
Oral every day.  
Template Non-Formulary   
(beet root) Oral every   
day.  
Template Non-Formulary   
(veggie and fruit   
tablet) Oral 2 times a   
day.  
traMADol (traMADol 50   
mg oral tablet) 1   
tab(s) Oral Every 12   
hours scheduled time   
as needed as needed   
for pain.  
zinc gluconate (zinc   
(as gluconate) 50 mg   
oral tablet) 1 tab(s)   
Oral every day.  
It is important to   
always keep an active   
list of medications   
available so that you   
can share with other   
providers and manage   
your medications   
appropriately. As an   
additional courtesy,   
we are also providing   
you with your final   
active medications   
list that you can keep   
with you.  
ascorbic acid (Vitamin   
C 1000 mg oral tablet)   
3 tab(s) Oral every   
day.  
aspirin (aspirin 81 mg   
oral delayed release   
tablet) 1 tab(s) Oral   
every day.  
baclofen (baclofen 10   
mg oral tablet) 1   
tab(s) Oral every day.  
biotin (biotin 5000   
mcg oral capsule) 1   
cap(s) Oral every day.  
cholecalciferol   
(Vitamin D3 1000 intl   
units oral tablet) 1   
tab(s) Oral 2 times a   
day.  
cranberry (Cranberry   
oral capsule) 1 tab(s)   
Oral 2 times a day.  
garlic (Garlic Oil   
oral capsule) 1 cap(s)   
Oral 2 times a day.  
hydroCHLOROthiazide   
(hydroCHLOROthiazide   
25 mg oral tablet) 1   
tab(s) Oral every day.  
liothyronine   
(liothyronine 5 mcg   
oral tablet) 1 tab(s)   
Oral every day.  
losartan (losartan 25   
mg oral tablet) 1   
tab(s) Oral every day.  
magnesium gluconate   
(magnesium gluconate   
250 mg oral tablet) 1   
tab(s) Oral every day.  
metFORMIN (metFORMIN   
500 mg oral tablet) 1   
tab(s) Oral 2 times a   
da (more content not   
included)...        Normal                                  [facility]  
   
                                                    Anesthesia Noteon 2023  
   
   
                                        Anesthesia Note     Patient: SHERRIE LOZADA MRN:   
[id number] FIN: [id number]  
Age: 65 years Sex:   
FEMALE : 1957  
Associated Diagnoses:   
None  
Author: Miguel Salgado MD  
Postoperative   
Information  
Post Operative Note:   
Operative Day.  
Anesthetic utilized:   
General.  
Health Status  
Allergies:  
Allergic Reactions   
(All)  
Moderate  
Latex- Blister.  
Percocet- Itching.  
Vicodin- Itching.  
Mild  
Nickel- Rash.  
Penicillin- Rash.  
Problem list:  
All Problems  
Hypertension / SNOMED   
CT 3978921906 /   
Confirmed  
Type 2 diabetes   
mellitus / SNOMED CT   
743173662 / Confirmed  
Physical Examination  
Vital Signs (last 24   
hrs)_____ Last   
Charted___________  
Heart Rate Monitored L   
58 bpm (SEP 18 13:31)  
Resp Rate 16 br/min   
(SEP 18 13:31)  
 mmHg (SEP 18   
13:31)  
DBP 68 mmHg (SEP 18   
13:31)  
Review / Management  
Condition: Stable.  
Assessment  
Anesthetic outcome  
No anesthetic   
complications noted.  
Adequate pain relief.  
awake, no pain.   
Hydration appears   
adequate.  
No Complaint of nausea   
and vomiting.  
Plan  
Transfer/ Discharge:   
Patient can be   
discharged from PACU   
when criteria met.  
Condition good.  
[Electronically Signed   
on: 2023 13:36   
EDT]  
______________________  
__________________  
Miguel Salgado MD   
[Verified on:   
2023 13:36 EDT]  
______________________  
__________________  
Miguel Salgado MD  [facility]  
   
                                        Anesthesia Note     Patient: SHERRIE LOZADA MRN:   
[id number] FIN: [id number]  
Age: 65 years Sex:   
FEMALE : 1957  
Associated Diagnoses:   
None  
Author: Miguel Salgado MD  
Preoperative   
Information  
Anesthesia history:  
Patient history: No   
difficult intubation,   
No malignant   
hyperthermia.  
Family history: No   
malignant   
hyperthermia.  
Review of Systems  
Constitutional:   
Negative.  
Respiratory: Negative,   
No shortness of   
breath.  
Cardiovascular: No   
chest pain.  
Neurologic: Alert and   
oriented X4.  
Health Status  
Allergies:  
Allergic Reactions   
(All)  
Moderate  
Latex- Blister.  
Percocet- Itching.  
Vicodin- Itching.  
Mild  
Nickel- Rash.  
Penicillin- Rash.  
Current medications:  
Home Medications (20)   
Active  
aspirin 81 mg oral   
delayed release tablet   
81 mg = 1 tab(s), PO,   
Daily  
baclofen 10 mg oral   
tablet 10 mg = 1   
tab(s), PO, Daily  
beet root , PO, Daily  
biotin 5,000 unit(s),   
PO, Daily  
Cranberry oral capsule   
1 tab(s), PO, BID  
Fish Oil 1000 mg oral   
capsule 1,000 mg = 1   
cap(s), PO, BID  
Garlic Oil oral   
capsule 1 cap(s), PO,   
BID  
hydroCHLOROthiazide 25   
mg oral tablet 25 mg =   
1 tab(s), PO, Daily  
liothyronine 5 mcg   
oral tablet 5 mcg = 1   
tab(s), PO, Daily  
losartan 25 mg oral   
tablet 25 mg = 1   
tab(s), PO, Daily  
magnesium gluconate   
250 mg oral tablet 250   
mg = 1 tab(s), PO,   
Daily  
metFORMIN 500 mg oral   
tablet 500 mg = 1   
tab(s), PO, BID  
metoprolol tartrate 25   
mg oral tablet 25 mg =   
1 tab(s), PO, BID  
pantoprazole 40 mg   
oral delayed release   
tablet 40 mg = 1   
tab(s), PO, Daily  
simvastatin 20 mg oral   
tablet 20 mg = 1   
tab(s), PO, Daily  
traMADol 50 mg oral   
tablet 50 mg = 1   
tab(s), PRN, PO, q12hr  
veggie and fruit   
tablet , PO, BID  
Vitamin C 1000 mg oral   
tablet 3,000 mg = 3   
tab(s), PO, Daily  
Vitamin D3 1000 intl   
units oral tablet 25   
mcg = 1 tab(s), PO,   
BID  
zinc (as gluconate) 50   
mg oral tablet 50 mg =   
1 tab(s), PO, Daily  
Problem list:  
All Problems  
Hypertension / SNOMED   
CT 3630425866 /   
Confirmed  
Type 2 diabetes   
mellitus / SNOMED CT   
242170838 / Confirmed  
Histories  
Family History:  
Entire family history   
is negative.  
Procedure history:  
Cholecystectomy   
(45925849).  
Back (200710624).  
Comments:  
2023 13:11 Bozena Mann RN  
cyst removed  
Hip arthroplasty   
(325754232).  
Arthroscopic repair of   
rotator cuff.   
(6194098254).  
Heel (840294512).  
Comments:  
2023 13:11 Bozena Mann RN  
heel spur  
Social History  
Electronic   
Cigarette/Vaping   
Assessment  
Electronic Cigarette   
Use: Never.  
Alcohol Assessment  
Use: Past.  
Tobacco Assessment  
Former tobacco user   
Tobacco Use:.  
Substance Abuse   
Assessment  
Substance use: Never.  
.  
Social & Psychosocial   
Habits  
Alcohol  
2023 Alcohol   
Use: Past  
Substance Use  
2023 Substance   
use: Never  
Tobacco  
2023 Smoking   
tobacco use: Former   
tobacco user  
Electronic   
Cigarette/Vaping  
2023 Electronic   
Cigarette Use: Never  
.  
Physical Examination  
Vital Signs (last 24   
hrs)_____ Last   
Charted___________  
Heart Rate Monitored L   
58 bpm (SEP 18 10:40)  
Resp Rate 14 br/min   
(SEP 18 10:40)  
SBP H 149 mmHg (SEP 18   
10:40)  
DBP L 58 mmHg (SEP 18   
10:40)  
Airway:  
Mallampati   
classification: II   
(soft palate, fauces,   
uvula visible).  
Temporomandibular   
joint mobility: Good.  
Mouth: Adequate   
opening, Teeth (   
unremarkable ).  
Neck: Supple,   
Non-tender, Full range   
of motion, very thick,   
full neck.  
Respiratory: Lungs are   
clear to auscultation.  
Cardiovascular:   
Regular rhythm.  
Neurologic: Alert,   
Oriented.  
Review / Management  
Laboratory Results  
Plan  
American Society of   
Anesthesiologists   
(ASA) physical status   
classification: Class   
II.  
Anesthetic   
Preoperative Plan  
Anesthesia: General.  
, Regional   
(Interscalene Block,   
for post op pain   
control per surgeon   
request). Anesthetic   
plan, risks, benefits,   
and alternatives   
discussed with the   
patient and/or family.   
Patient verbalized   
understanding.   
Informed consent was   
given. Consent was   
signed by the patient.  
[Electronically Signed   
on: 2023 10:46   
EDT]  
______________________  
__________________  
Miguel Salgado MD   
[Verified on:   
2023 10:46 EDT]  
______________________  
__________________  
Miguel Salgado MD  [facility]  
   
                                                    MAGR Intraoperative Recordon  
 2023   
   
                                                    MAGR Intraoperative   
Record                                  MAGR Intra-Op Record   
Summary  
Primary Physician:   
Jose Abraham DO  
Case Number:   
[id number]  
Finalized Date/Time:   
23 13:29:31  
Pt. Name: SHERRIE LOZADA/Sex: 1957   
FEMALE  
Med Rec #: [id number]  
Physician: Jose Abraham DO  
Financial #: [id number]  
Pt. Type: D  
Room/Bed: [facility] [place]  
Admit/Disch: 23   
08:33:12 -  
Institution:  
Case Times MAGR  
Entry 1  
Patient  
In Room Time 23   
11:14:00 Out Room Time   
23 13:25:00  
Anesthesia  
Start Time 23   
11:13:00 Stop Time   
23 13:29:00  
Surgery  
Start Time 23   
11:52:00 Stop Time   
23 13:17:00  
Last Modified By:   
Lexi Rothman RN  
23 13:29:26  
Case Attendance MAGR  
Entry 1 Entry 2 Entry   
3  
Case Attendee   
Jose Abraham Diane RN Kerro, Robert M MD Andrew DO  
Role Performed Surgeon   
- Primary Circulator   
Anesthesiologist of  
Record  
Time In 23   
11:28:00 23   
11:14:00 23   
11:14:00  
Time Out 23   
13:05:00 23   
13:25:00 23   
13:25:00  
Procedure Arthroplasty   
Shoulder Arthroplasty   
Shoulder Arthroplasty   
Shoulder  
Total Reverse(Left)   
Last Modified By:   
Lexi Rothman RN, Diane RN Kokinda, Diane RN  
23 13:25:19   
Entry 4 Entry 5 Entry   
6  
Case Attendee Elayne Napier   
HairYumiko   
CST Carol Ann Wilder CST  
CST  
Role Performed First   
Assistant First   
Assistant Scrub   
Personnel  
Time In 23   
11:14:00 23   
11:14:00 23   
11:14:00  
Time Out 23   
13:25:00 23   
13:25:00 23   
13:25:00  
Procedure Arthroplasty   
Shoulder Arthroplasty   
Shoulder Arthroplasty   
Shoulder  
Total Reverse(Left)   
Last Modified By:   
Lexi Rothman RN, Diane RN Kokinda, Diane RN  
23 13:25:19   
General Comments:  
DEANA CANTU AND MIKE CARVER -AR[facility]RE[facility] REPS  
Surgical Procedures   
MAGR  
Pre-Care Text:  
A.20 Verifies   
operative procedure,   
surgical site, and   
laterality Im.150   
Develops   
individualized plan of   
care  
Entry 1  
Procedure Arthroplasty   
Shoulder Primary   
Procedure Yes  
Total Reverse  
Primary Surgeon   
Jose Abraham DO  
Surgeon Comment LEFT   
REVERSE TOTAL Start   
23 11:52:00  
SHOULDER  
Stop 23 13:17:00   
Anesthesia Type   
General  
Surgical Service   
Orthopedics Wound   
Class Clean  
Technique Details  
Closure Technique   
Primary Entire   
procedure No  
was performed via  
laparoscope or  
robotic assistance  
Last Modified By:   
Lexi Rothman RN  
23 13:25:24  
Post-Care Text:  
O.730 The patient's   
care is consistent   
with the   
individualized   
perioperative plan of   
care  
General Case Data MAGR  
Pre-Care Text:  
A.350.1 Classifies   
surgical wound  
Entry 1  
Case Information  
OR MAGR OR 05 Case   
Level Level 5  
Wound Class Clean   
Specialty Orthopedics  
ASA Class 2  
Diagnosis  
Preop Diagnosis DJD   
Postop Same As Preop   
Yes  
Postop Diagnosis DJD  
Blunt or No Is the   
procedure No  
penetrating injury   
considered  
occured prior to   
Emergent/Urgent?  
the start of the  
procedure:  
Last Modified By:   
Lexi Rothman RN  
23 12:03:26  
Post-Care Text:  
O.760 Patient receives   
consistent and   
comparable care   
regardless of the   
setting  
Time Out MAGR  
Entry 1  
Time out date/time   
23 11:51:00 All   
team members Yes  
have introduced  
themselves by name  
and role  
Surgeon, Yes Surgeon   
reviews Yes  
anesthesia, nurse   
critical or  
confirm patient,   
unexpected steps,  
site, procedure   
operative duration,  
anticipated blood  
loss  
Anesthesia team Yes   
Nursing team Yes  
reviews any reviews   
sterility  
patient-specific   
(including  
concerns indicator   
results)  
and equipment  
issues/concerns  
Antibiotic  
Antibiotic Yes   
Administration Time   
11:13  
prophylaxis given  
within the last 60  
minutes  
Last Modified By:   
Lexi Rothman RN  
23 12:03:58  
Patient Positioning   
MAGR  
Pre-Care Text:  
A.280 Identifies   
baseline   
musculoskeletal status   
Im.40 Positions the   
patient Im.80 Applies   
safety devices  
Entry 1  
Procedure Arthroplasty   
Shoulder Body Position   
Beach Chair  
Total Reverse(Left)  
Left Arm Position   
Resting at Side Right   
Arm Position Resting   
at Side  
Left Leg Position   
Other/see comments   
Right Leg Position   
Other/see comments  
Feet Uncrossed? Yes   
Press Points Checked   
Yes  
Additional PILLOWS   
USED UNDER Positioning   
Device Pillow, Safety   
Strap  
Information LEGS,   
PILLOW UNDER  
CALVES TO KEEP HEELS  
OFF THE MATRESS,  
NON-OPERATIVE ARM IN  
ARM SUPPORT  
Outcome Met (O.80) Yes  
Last Modified By:   
Lexi Rothman RN  
23 12:04:10  
Post-Care Text:  
E.290 Evaluates   
musculoskeletal status   
O.80 Patient is free   
from signs and   
symptoms of injury   
related to  
positioning  
Skin Prep MAGR  
Pre-Care Text:  
A.30 Verifies   
allergies Im.270   
Performs skin   
preparation Im.270.1   
Implements protective   
measures to prevent  
skin and tissue injury   
due to chemical   
sources  
Entry 1  
Skin Prep Syntegrity  
Prep Agents (Im.270)   
(more content not   
included)...        Normal                                  [facility]  
   
                                                    MAGR Intraoperative   
Record                                  MAGR Intra-Op Record   
Summary  
Primary Physician:  
Case Number:   
[id number]  
Finalized Date/Time:   
23 11:13:51  
Pt. Name: KIERRAGRAYNILE SPRING  
/Sex: 1957   
FEMALE  
Med Rec #: [id number]  
Physician: Jose Abraham DO  
Financial #: [id number]  
Pt. Type: D  
Room/Bed: [facility] [place]  
Admit/Disch: 23   
08:33:12 -  
Institution:  
Case Times MAGR  
Entry 1  
Patient  
In Room Time 23   
10:25:00 Out Room Time   
23 11:14:00  
Anesthesia  
Start Time 23   
10:26:00 Stop Time   
23 10:37:00  
Surgery  
Start Time 23   
10:30:00 Stop Time   
23 10:37:00  
Last Modified By:   
Karen Mathews RN  
23 11:13:42  
General Comments:  
nerve block to left   
shoulder  
Case Attendance MAGR  
Entry 1 Entry 2 Entry   
3  
Case Attendee Miguel Salgado MD, Lora RN Slauterbeck, Linda RN  
Role Performed   
Anesthesiologist of   
Circulator Circulator  
Record  
Time In 23   
10:25:00 23   
10:23:00 23   
10:24:00  
Time Out 23   
10:37:00 23   
10:40:00 23   
10:46:00  
Procedure Interscalene   
Block(Left)   
Interscalene   
Block(Left)   
Interscalene   
Block(Left)  
Last Modified By:   
Karen Mathews RN, Linda RN Slauterbeck, Linda RN  
23 10:44:44   
Surgical Procedures   
MAGR  
Pre-Care Text:  
A.20 Verifies   
operative procedure,   
surgical site, and   
laterality Im.150   
Develops   
individualized plan of   
care  
Entry 1  
Procedure Interscalene   
Block Primary   
Procedure Yes  
Primary Surgeon Miguel Salgado MD Modifiers   
Left  
Surgeon Comment   
SCALENE BLOCK LEFT   
Start 23   
10:30:00  
REVERSE TOTAL SHOULDER  
Stop 23 10:37:00   
Anesthesia Type   
Regional Block  
Surgical Service   
Anesthesia Wound Class   
Clean  
Technique Details  
Closure Technique N/A   
Entire procedure No  
was performed via  
laparoscope or  
robotic assistance  
Last Modified By:   
Karen Mathews RN  
23 10:45:22  
Post-Care Text:  
O.730 The patient's   
care is consistent   
with the   
individualized   
perioperative plan of   
care  
General Case Data MAGR  
Pre-Care Text:  
A.350.1 Classifies   
surgical wound  
Entry 1  
Case Information  
OR MAGR Proc Room Case   
Level None  
Wound Class Clean   
Specialty Anesthesia  
ASA Class 2  
Diagnosis  
Preop Diagnosis   
SCALENE BLOCK PRIOR TO   
Postop Same As Preop   
Yes  
LEFT REVERSE TOTAL  
SHOULDER  
Postop Diagnosis   
SCALENE BLOCK PRIOR TO  
LEFT REVERSE TOTAL  
SHOULDER  
Blunt or No Is the   
procedure No  
penetrating injury   
considered  
occured prior to   
Emergent/Urgent?  
the start of the  
procedure:  
Last Modified By:   
Karen Mathews RN  
23 10:46:10  
Post-Care Text:  
O.760 Patient receives   
consistent and   
comparable care   
regardless of the   
setting  
Time Out MAGR  
Entry 1  
Time out date/time   
23 10:26:00 All   
team members Yes  
have introduced  
themselves by name  
and role  
Surgeon, Yes Surgeon   
reviews Yes  
anesthesia, nurse   
critical or  
confirm patient,   
unexpected steps,  
site, procedure   
operative duration,  
anticipated blood  
loss  
Anesthesia team Yes   
Nursing team Yes  
reviews any reviews   
sterility  
patient-specific   
(including  
concerns indicator   
results)  
and equipment  
issues/concerns  
Antibiotic  
Antibiotic N/A  
prophylaxis given  
within the last 60  
minutes  
Is essential Yes  
imaging displayed?  
Last Modified By:   
Karen Mathews RN  
23 10:46:47  
Patient Positioning   
MAGR  
Pre-Care Text:  
A.280 Identifies   
baseline   
musculoskeletal status   
Im.40 Positions the   
patient Im.80 Applies   
safety devices  
Entry 1  
Procedure Interscalene   
Block(Left) Body   
Position Supine  
Left Arm Position   
Resting at Side Right   
Arm Position Resting   
at Side  
Left Leg Position   
Extended Right Leg   
Position Extended  
Feet Uncrossed? Yes   
Press Points Checked   
Yes  
Outcome Met (O.80) Yes  
Last Modified By:   
Karen Mathews RN  
23 10:47:14  
Post-Care Text:  
E.290 Evaluates   
musculoskeletal status   
O.80 Patient is free   
from signs and   
symptoms of injury   
related to  
positioning  
Skin Prep MAGR  
Pre-Care Text:  
A.30 Verifies   
allergies Im.270   
Performs skin   
preparation Im.270.1   
Implements protective   
measures to prevent  
skin and tissue injury   
due to chemical   
sources  
Entry 1  
Skin Prep Syntegrity  
Prep Agents (Im.270)   
Chlorhexidine   
Gluconate Prep By   
Miguel Salgado MD  
and Alcohol  
Prep Area (Im.270)   
Shoulder, Neck Prep   
Area Details Left  
Skin Prep Agent Dry   
Yes  
Without Pooling  
Hair Removal  
Syntegrity  
Hair Removal Methods   
No hair removal  
performed  
Outcome Met (O.100)   
Yes  
Last Modified By:   
Karen Mathews RN  
23 10:48:06  
Post-Care Text:  
E.10 Evaluates for   
signs and symptoms of   
physical injury to   
skin and tissue O.100   
Patient is free from   
signs  
and symptoms of   
chemical injury  
Departure from OR MAGR  
Entry 1  
Present on Depart   
Oxygen Via Stretcher  
Post-op Destination   
[place]  
Skin DFO  
Condition Intact  
Description  
Condition Warm  
Description  
Report Given To   
Lexi Rothman RN  
Airway Maintenance  
Patient Status Stable   
Oxygen in Use? Yes  
Airway D (more content   
not included)...    [facility]  
   
                                                    MAGR PACU Recordon 09-  
3   
   
                                        MAGR PACU Record    MAGR PACU Record   
Summary  
Primary Physician:   
Jose Abraham DO  
Case Number:   
[id number]  
Finalized Date/Time:   
23 14:30:30  
Pt. Name: SHERRIE LOZADAO.B./Sex: 1957   
FEMALE  
Med Rec #: [id number]  
Physician: Jose Abraham DO  
Financial #: [id number]  
Pt. Type: D  
Room/Bed: [facility]  
Admit/Disch: 23   
08:33:12 -  
Institution:  
PACU Case Times MAGR  
Entry 1  
In PACU I 23   
13:25:00 Discharge   
from PACU 23   
14:14:00  
I  
Last Modified By:   
Reanna Silver RN  
23 14:30:27  
Finalized By: Reanna Silver RN  
Document Signatures  
Signed By:  
Reanna Silver RN   
23 14:30      [facility]  
   
                                                    MAGR Preoperative Recordon 0  
2023   
   
                                                    MAGR Preoperative   
Record                                  MAGR Pre-Op Record   
Summary  
Primary Physician:   
Jose Abrahma DO  
Case Number:   
[id number]  
Finalized Date/Time:   
23 14:30:46  
Pt. Name: SHERRIE LOZADA  
/Sex: 1957   
FEMALE  
Med Rec #: [id number]  
Physician: Jose Abraham DO  
Financial #: [id number]  
Pt. Type: D  
Room/Bed: [facility] [place]  
Admit/Disch: 23   
08:33:12 -  
Institution:  
Pre-Op Case Times MAGR  
Pre-Care Text:  
Patient will be   
optimally prepared for   
surgery. Patient is   
free from s/s of   
injury. Provide   
information to  
patient/family related   
to plan of care.   
Verify patient   
allergies. Confirm   
identity and verify   
consent before  
the operative or   
invasive procedure.  
Entry 1  
Patient Arrival Time   
23 08:50:00   
Preop Departure   
23 11:13:00  
Last Modified By:   
Reanna Silver RN  
23 14:30:43  
Post-Care Text:  
Patient is prepared   
mentally and   
physically and is   
ready for surgery. The   
patient remains free   
from s/s of  
injury. Patient/family   
express understanding   
of plan of care and   
participate in   
decisions affecting   
his or her  
perioperrative plan of   
care. Allergies   
documented   
appropriately. Patient   
identifiers and   
consent correct.  
General Comments:  
pt arrives to [facility]   
ambulatory. denies   
CP,cough,cold, COVID   
like symptoms. pt has   
diabetes, denies  
pacemaker/defib, sleep   
apnea. pt verbalizes   
understanding of post   
op anesthesia orders   
including no driving   
for  
24 hours.  
Finalized By: Reanna Silver RN  
Document Signatures  
Signed By:  
Reanna Silver RN   
23 14:30      Normal                                  [facility]  
   
                                                    Nutrition Noteon 2023   
   
                                        Nutrition Note      Pt admitted for   
scheduled Lt shoulder   
surgery. Currently   
NPO. Pre-op wt 93.4kg   
w/ no recent previous   
wts on file.    
pre-op labs reviewed,   
Na 135L. Pt w/ DM,   
this am BS at   
113mg/dl. Per med hx,   
Pt does take several   
OTC   
vitamins/minerals/supp  
lements. No   
chewing/swallowing   
problems identified.   
Once diet advanced,   
recommend 1800CD DM   
for carb consistency.   
Other than age at 65y   
w/ surgery, Pt appears   
at low nutrition risk.   
Will monitor wts,   
intake and labs.    Normal                                  [facility]  
   
                                                    Operative Report - Surgeon/P  
[email or web address] 2023   
   
                                                    Operative Report -   
Surgeon/Physician                       Preoperative   
diagnosis: Primary   
osteoarthritis left   
shoulder  
Postoperative   
diagnosis: Same  
Procedure: Reverse   
total shoulder   
replacement left  
Surgeon: LOPEZ Abraham D.O.  
Anesthesia: General  
Indications for   
surgery: Radiographic   
findings consistent   
with arthritis   
progressive pain   
symptoms with loss of   
function and failure   
of conservative   
treatment  
Estimated blood loss:   
100  
Complications: No   
complications  
Findings: Bone-on-bone   
and flattening of the   
humeral head and   
inferior humeral head   
osteophyte with   
absence of articular   
cartilage in the   
glenoid and humeral   
head  
Procedure summary:   
After administration   
of general anesthesia   
the patient was placed   
in the beachchair   
position the left   
shoulder was prepped   
with isopropyl alcohol   
and then with   
Betadine. The patient   
was draped out in the   
usual fashion and a   
timeout was taken. An   
anterior incision with   
the anterior   
deltopectoral approach   
was utilized. The   
cephalic vein was   
identified and it was   
mobilized laterally   
with the deltoid. The   
pectoralis and   
conjoined tendon and   
biceps tendon were all   
identified. The   
pectoralis was   
released about a   
centimeter and then   
the biceps was   
transected at the   
level of the   
pectoralis. The   
capsule and what   
remained of the   
subscapularis were   
reflected medially and   
the shoulder was   
dislocated there was   
gross deformity of the   
humeral head with   
advanced arthritic   
changes. Utilizing a   
20 degree version   
guide a cut was taken.   
The humerus was opened   
with a hand-held   
reamer and then   
broaching sequentially   
up to size 7. The size   
7 broach was left in   
place and my attention   
was turned towards the   
glenoid. What remained   
of the labrum was   
excised the biceps   
stump was excised. A   
24 mm baseplate guide   
was used to inserted a   
Steinmann pin. Reaming   
was performed over   
this to prepare the   
glenoid surface and   
then reaming was   
performed for the   
central post to 25 mm.  
2 mm offset 24   
baseplate with a 25 mm   
post was impacted into   
place it was snug and   
secure. It was   
stabilized with 2   
nonlocking screws and   
2 locking screws. Next   
a 36+4 glenosphere was   
impacted into place.   
This was further   
stabilized with a   
locking screw   
centrally that was   
torqued between 4 and   
5.  
The proximal humerus   
was prepared with the   
reamer and then a   
neutral cup was   
clicked into place for   
trial reductions with   
a +3 mm poly. The   
shoulder was stable   
the deltoid was   
tensioned with good   
range of motion. The   
trial components were   
removed and the broach   
was removed and a size   
7 apex stem with a   
neutral cup was   
assembled on the back   
table and then   
implanted into the   
patient. Next 3 mm x   
36 liner was clicked   
into place and the   
shoulder was reduced.   
The shoulder was taken   
through range of   
motion it was stable.  
There was no tendency   
towards dislocation   
there was good range   
of motion and the   
deltoid was tensioned   
appropriately. The   
joint was soaked in   
diluted Betadine and   
then dried. The   
subcutaneous fascia   
was closed with a 2-0   
Vicryl then a running   
2-0 Vicryl stitch and   
then a running 3-0   
Vicryl subcuticular   
closure followed by   
tincture benzoin and   
Steri-Strips. Sterile   
dressings were   
applied. The patient   
tolerated the surgery   
without complications.  
[Electronically Signed   
on: 2023 13:39   
EDT]  
______________________  
__________________  
Jose Abraham DO [Verified   
on: 2023 13:39   
EDT]  
______________________  
__________________  
Jose Abraham DO           Normal                                  [facility]  
   
                                                    POCT Glucose Levelon   
023   
   
                      Glucose [Mass/Vol] 108 mg/dL  Normal          [facility]  
   
                                        Comment on above:   Performed By: #### 4  
[id number] ####[facility]   
(DEFAULT)[address] [place] [address]   
   
                      Glucose [Mass/Vol] 113 mg/dL  Normal          [facility]  
   
                                        Comment on above:   Performed By: #### 4  
[id number] ####[facility]   
(DEFAULT)[address] [place] [address]   
   
                                                    Patient Handouton 2023  
   
   
                                        Patient Handout     DR. MANDEL POST  
   
OPERATIVE SHOULDER   
INSTRUCTIONS  
SURGEONS WRITTEN   
INSTRUTCTIONS:  
1. If you have been   
given a cryo cuff   
after surgery you   
should use it as much   
as possible for the   
first 24-48 hours.   
After that it is   
optional. TIP: Many   
patients prefer to use   
it a little longer   
because it helps   
reduce pain  
2. You should wiggle   
your fingers   
frequently  
3. Change your   
dressings in 1 day. If   
steri-strips have been   
applied DO NOT remove   
them. When the wound   
is clean and dry you   
may leave it open to   
air but again DO NOT   
remove any   
steri-strips that have   
been applied  
4. You may shower in 1   
day but do not let the   
water stream directly   
strike the wound  
5. Do pendulum   
exercises for at least   
10 minutes twice a day  
6. If you have any   
problems or concerns,   
please call the office   
at [phone]  
7. Follow up as   
scheduled           [facility]  
   
                                                    Progress Note - Nurseon -   
   
                                                    Progress Note -   
Nurse                                   Patient arrives via   
bed to room from   
recovery. Drowsy but   
easily aroused. Vital   
signs per iview.   
Oxygen sat 88% while   
patient dozing so   
oxygen applied at 2   
liters per NC until   
more awake. Daughter   
at bedside. Denies   
pain.  
[Electronically Signed   
on: 2023 15:56   
EDT]  
______________________  
__________________  
Mary Godinez RN   
[Verified on:   
2023 15:56 EDT]  
______________________  
__________________  
Mary Godinez RN     [facility]  
   
                                                    Progress Note - Nurseon    
   
                                                    Progress Note -   
Nurse                                   Pre op phone call,   
spoke with patient.   
Confirmed time of   
arrival for 0600 on   
23. Reviewed pre   
op instructions.   
Patient questioned if   
we got the ECHO   
results? States that   
her family doctor Dr. Veliz ([place]) had   
ordered it for   
clearance for the   
upcoming surgery.   
Informed I was not   
aware of the ECHO   
being done but would   
look into it. Informed   
that she would only   
hear back if something   
was abnormal with the   
ECHO. Verbalized   
understanding.  
[Electronically Signed   
on: 09/15/2023 09:30   
EDT]  
______________________  
__________________  
Tanisha Harrison RN   
[Verified on:   
09/15/2023 09:30 EDT]  
______________________  
__________________  
Tanisha Harrison RN   
Call made to Dr. Abraham's office,   
spoke with Loren.   
Informed of the above.   
States she will have   
Pia call me back.  
[Electronically Signed   
on: 09/15/2023 09:34   
EDT]  
______________________  
__________________  
Tanisha Harrison RN from Dr. Abraham's office   
called. Stated they   
are waiting for the   
ECHO to be read and   
will hopefully have   
the clearance letter   
today or first thing   
Monday. Will call and   
inform patient that   
they will be coming in   
at 0830 instead of   
0600.  
[Electronically Signed   
on: 09/15/2023 10:08   
EDT]  
______________________  
__________________  
Tanisha Harrison RN [facility]  
   
                                                    C Urineon 2023   
   
                                        C Urine             Urine Culture ordere  
d   
as a result of   
parameters set on   
specific urine dip and   
urine microsopic   
results.  
30,000 cfu/ml   
Klebsiella pneumoniae  
ORGANISM  
Klepne  
----------------------  
--- SUSCEPTIBILITY   
----------------------  
--  
ORGANISM ID: 1  
ANTIBIOTIC   
INTERPRETATION KELSIE   
STATUS  
ORGANISM KlepneKlepne  
Amik S <=16 Verified  
Amox/Cla S <=8/4   
Verified  
Amp R >16 Verified  
Amp/Sul S <=8/4   
Verified  
Azt S 8 Verified  
Cefaz S <=2 Verified  
Cefep S <=8 Verified  
Cefo S <=2 Verified  
Ceftaz S <=1 Verified  
Ceftri S <=1 Verified  
Cefur S <=4 Verified  
Ceph S <=8 Verified  
Cipro S <=1 Verified  
Ertap S <=0.5 Verified  
Gent S <=2 Verified  
Imi S <=1 Verified  
Levo S <=2 Verified  
Nitro S <=32 Verified  
Pip/Davion S <=16   
Verified  
Tetra S <=4 Verified  
Tobra S <=4 Verified  
Tri/Sulf S <=2/38   
Verified            Normal                                  [facility]  
   
                                        Comment on above:   Performed By: #### 1  
[id number] ####  
[facility] (DEFAULT)  
[address] [place] [address]   
   
                                                    Coding Summaryon 2023   
   
                                        Coding Summary      HTMLBase 64   
QjcjkhrqXJc7uDf+PGhlYW  
Q+RA1UIZGsG64coTQebC6o  
N3NUQKlRQdroHXAOFGpCFl  
NnhpQkEC0hjGSiHSGm  
IC8+JH6tBUGqDhqxgWSoj8  
C4mGS4B36tmx8nASaioOG4  
RMGgTmGyvgbwz0gbpEa7WS  
cuNmluOyBt  
JQFwdV33BZR5vR02Nf03iM  
MfvJZgi2efuSu9KjHtTWWs  
EZQ7rNjgDObio2FzJERpZ6  
4gwLThg2V2  
JHOluYdzqYNkGeVqaIW3fX  
0dPEqqpsvkz5ugmqzlJfm0  
cx74eJYzs6H7kVK4K5Blki  
H7SMVifTHz  
QmdeiASEtQ9sxcjqg0azqw  
urOkUmBZNtCWd9PWr9GPEe  
mBwwAwOiKY76TPL3BKLhxo  
KgC6YzWLKi  
bKcnBrH7w9E1Wg9PL2FXPg  
mbX2MAOKUJELbvqHW+PC90  
js47Y3DsCmagSii5EFRiWV  
X2aNJ6qQ3x  
GSFdTYfgc6Z8sCM5U7Zidn  
Qgdj8tt4ayTMPiLPhgC24j  
rDLwp6W0NKPpfZY9ZXJeyB  
nsSgCamF05  
Oyc+GPFfrNyyq9SmVegrc9  
lar8oqpGc3HztiIASrzoZx  
hCxtYXI7d4BjVi7gOVEuzM  
G4ePM0wO1n  
HaHoTsO0UTcrZ987TaFeuJ  
QkXubjB56yB5MlzKT+PHRy  
Kjy1OAMtfFsiJJ5qC9IuFB  
RpbmctbGVm  
oUgqLD9cTIMpnuqdNBZbjK  
7wDRJcE9h7FaQoPhM5BJhy  
U0PoHHYuwaqnYt41lY3xRv  
VmZbR8ZJfj  
I5YgzwP2EKBacPNzUKexCX  
K1D36zl9D3NCVxXFIsUDZ9  
dFM1gR7llYhmynkjlRQesW  
sgdmVydGlj  
VWrhTSsaG418VMHlqIksYl  
NvZGluZyBEYXRlOiAgMDgv  
MjUvMjAyMzwvdGQ+PHRkIH  
K9kPboXJVc  
zBIzMJqiJd7ixYmduJfrKJ  
5xZGEgwyqnXVIrlX2lDNWs  
rGUjvLtiDK6jHDIdojcmk6  
06EwHhQWX6  
ISHykSKoS3DtuG2bRuSkPU  
WbYKPtL3JaoGGpJOfuG334  
QXhlBeG9IUUmblPtF0KsRR  
FsaWduOiB0  
d6T7Be0Bk3YaeymnL9XjmL  
BeLhOpKhbgNGn8A1BkBlku  
dHI+WF59UOLbBF85JGr5HW  
X9vJvlZJow  
FKCqB9FqwH7zIyEyPZUeRV  
RkOyc+PHRhYmxlIHdpZHRo  
WTubBIOoIzJkdXnxKC2yLj  
9yZGVyLWNv  
lUolmBWjInZyr9alDHPtGN  
dqWC0cbXvtC4SewQD3FUYe  
l4h6Du49X45jC7FzqFP+PG  
DscCU7sNT7  
mE2pOvOyDkY6TPtxF873Wj  
TxcVLwApyeb0ffg1wbgFz1  
ItH8ATBfatYgzKshYWN0r7  
WlWi72P76b  
IHdpZHRoPSIxNSUiIHZhbG  
dtdl4ruC0qKo5+PGNvbCB3  
pYT4jS5mVlJqUiF1JSczK9  
49InRvcCIv  
Smtqj5qwg1rloTf1AiArDK  
TxiiRedDtoBSC3o2LkZz73  
D5LnlTbpd2GxEzv0ac47lY  
Mus5S5wAO2  
W9HhTVAirlainLQgzFtnAN  
3nMJAbtnvvYKMdtY9zPIIa  
K7d3RcGzFuB4EDvdS0Aatm  
K8AAPtfNOi  
EYCpkSLFhZ9gfbsby9eivk  
wdZdZkBLKeAHl4ZBs0PVYh  
iLetEcVjBMA4TaK9BKR8xT  
YdiT9kqDuz  
zpgdwV9oOry+URG9mNEuvF  
ZSRV4sMwyjwXW+PHRkIHN0  
gOfbTNlgSMLnuN1yJDYyE1  
u5BjUfDdX0  
MVaqG2JxyyU6ZIAqqLHeQE  
NybNAMgO0uhzjhl6ytfnmv  
JxYyQXKyZYq8BPa0XEUrsT  
duOiBsZWZ0  
LyI4XLZ8eZOxmZ6siObhxn  
tmkG5zFgi+QmlydGggRGF0  
VLv6E6UsKlj5VCUaqBbyIN  
0ncGFkZGlu  
Hu9gnBmblWgsXV3oDDTvdd  
zbx853CyLqp1yjJEHecYFt  
UVqxIKX3H67qe9E2PTGzXZ  
NsUAE7wMZ4  
fK4bvKigehjclRNzjWhjre  
ApwWbeZOogYWlwC793QBYs  
nXrrQuRqSWh1B7OwJlm6XA  
UwxQjjBR0r  
kFPoWZdpWf1ruTmzyYonJA  
5uACYohjjic320UkWxh9xa  
ZKGljSNtUSueVLR9W80rw3  
F4FQOmIJPx  
WHP3bOV3wZ2zwPyvrosouM  
VmdDsgdmVydGljYWwtYWxp  
X706OWYhiGhbKbIreXz9B9  
DlOdk0TUOd  
vPkuGL4kzQLmFNhlIb3naA  
ebnBjmXX9eUEFxcsunv434  
CwDck8bfRDAobVRnDXmhFZ  
K9Y47og5I6  
WNTzVEQdHTP2iYL1fA6xbO  
lnbjogbGVmdDsgdmVydGlj  
JFofZCxfQ948NAQrvWutMa  
BhdGllbnQg  
IEryXRd9K1OdMhaltKU+PC  
32GLRsIN62zIUtrFUnu7sg  
cSw1LoIiBOPgHZC8pWyoWK  
lxm7PrIACf  
K57xbFBmq1V0LMSzuCcxuH  
IwHdYbaWV3kN1oIZejcany  
u0matnnvHdgmz9ymyx08mE  
35Y53kWJcy  
ZHRoPSIzMCUiIHZhbGlnbj  
2xcG6eFh1+KWBnzSP0vZV1  
oT3aUHGfVaR2OUadC940Bq  
RvcCIvPjxj  
l2etb0wocKo0RhD8ACEygw  
PzxWtiSKP8x7HsAh44M02u  
IHdpZHRoPSIyMCUiIHZhbG  
vtby1ftH6h  
Ii8+ADWzyDX9kKV2gD4iNn  
SkYvL8FNdaV097DbIrlOOn  
YxokS33bI1CvuBY+PHRyPj  
h5MYSveRqg  
GF5myUOtOZdfAr0tSPS4Zg  
GrBqVgPWweD3HkOPMhqroj  
mykqmCM6UXYnCSCncV98Fl  
9udDogMTBw  
gNAWzC6uvykyv1ebbvffTa  
BqKNIaZEm5VDq9QCWuvEuo  
EwUfGSN3ZmS5KRX5gNDxwI  
1hbGlnbjog  
yQ9pG4EuJDTvuppgUy73xR  
4xEkViZkO5OAghCji+Sk9O  
QEHlVZKMUd5TUAehPQiRVq  
wvdGQ+PHRk  
RCL6aVxvQZerHENwzK7zOV  
EpY1r1WkZnCzH4TEnvC3Vl  
RCOzpxsvXi16lF5jYqMlAl  
Q7FNoyG6Qb  
hmS5BMKflIXhLYisLSO9R0  
5ok1D0XVWiRDLtWLL8cKE4  
gS7gvZbqtssmzQIrcGggni  
VydGljYWwt  
DNjrG500CHFujIziUwHgSn  
S7ArF6PJv5O4CgCzt5HVKn  
rXefKJ5pbDOvZXtnAs2afX  
fixYmgEK1d  
AXXaeggtWWLrqG0rWAHkrY  
VyhGcoLR2zYDUfisjrq642  
LgNyNYS7RGBzmARmP8LswN  
9yOiAjMDAw  
YFRrG4CpkSCvCDjdV127BB  
fhMvC4CNJuswCyP4XlENOb  
gLfrZlV6w2A4Ib55GNYITF  
FyczwvdGQ+  
BJNxCIY6wVxmSPzcWSCiaC  
9zDFQaW7a4ErKjHeQ4YMum  
S1AgWVIbwbwcXq73hZ2iGz  
LvLqL5NNwz  
L5WapnY1IROiqADiIKwsME  
E1I98gc0N2SUXeCTJxNIY2  
rTG1xL8hmFaxzvuiwDHblL  
sgdmVydGlj  
EUhgXQezK870UPRnzVsuPd  
ZFTUFMRTwvdGQ+PHRkIHN0  
cBqaTZdeSGRvvO8zUSOwK3  
e9VzZmZoL4  
WVzbG7JvSLHqawsrPb61xP  
0qXqTyBnV6LVznV7BgppA1  
CZMnxWKmBMrfCEF0K93vh4  
R3LPOyZFPu  
MWN7iWR1sO3wxPjhnvuvlD  
VmdDsgdmVydGljYWwtYWxp  
C003YDItnNzoMc7CXJ93VN  
78G7SnWtgw  
dGFibGU+PHRhYmxlIHdpZH  
JzXCggPTRaUeBktGulNO1a  
Kl4cOXGkSBClnYdcsPBuSx  
Sfo2peWNVs  
KIduRK4pjGgzZ3PzgLW1IU  
Vdn1i4Jj40D99sO9TaoPX+  
LNRsfSA5qLU3qL3bVbPuQm  
H0GWzrG407  
NhDpsSZcDhjqa7gkk0reyW  
j9VvWiDUGouzSabSoaOXX4  
o5TsAa27S51kRMbbTJHtGA  
IyMCUiIHZh  
wDzeyx0ctW0jOh3+PGNvbC  
T0iOS4cC5xSpKoXpN6YUmn  
Y228BdCzfGKjMkkfG95rO2  
JvdXA+PHRy  
Ido8PRRqeZruJQ0txFGtEG  
nsGl0pPHG2ZpLwBgNbCAxk  
Q8VnHXEelmekcfesuRT3NQ  
QtPKYbmI54  
Ug2jdQtuJv4dVRVuKXM4RH  
DmaSXlP9LicO8mAsFqZVTi  
FSUjV5WmgEEkHNhaF801RJ  
cxOoW1YEGc  
wuWnH6GvEOMvcYawVrB7j3  
D8As5PqSxkcYBkTZ1bXeWo  
CMk7Q8MhEvo3ZUIkgSgpJE  
0ncGFkZGlu  
Ds2zjVeapFbnMP7fBVWkbs  
zko406TnKof2zkDYJcxIUf  
ADfzQNT1X38xx5M6SLVxGM  
HiGUJ1oYJ8  
tJ2pdEtnliicjIEypYhxde  
PleDaiOGyiOGuoV882HHRi  
wTftVkVZQtx8I7VyOsm4LR  
SqyFyaHE3h  
zWDfPJhlUu6iqFsefHijTG  
7tXZAhfkdhj844XuGyv1yj  
XHAesJUvQTesFZD7G62cm2  
B4PZZbODFa  
IGP8dOE1xC0mfZkvdbnktC  
VmdDsgdmVydGljYWwtYWxp  
D193TLYbmVzkYi8AVhr0V9  
QrUsq0BSSz  
aLboPU9wrEKvXVfkPr7qkP  
kzlLgyCV4uMBJfgvufr125  
XdQbq7ppQXEwnVZzSSytLS  
C6U86he1X7  
HFJtWIGkRJB6hEV6yU7tzW  
lnbjogbGVmdDsgdmVydGlj  
EDapWAnpC579QCEsbHpjFg  
BheWVyOjwv  
dGQ+ZO91an70H2WgBdykIg  
n1KUZcIOS5nYI6zE7dBWRs  
REgwq2G4jNV4B9YsifMkgp  
4mt0tmNSEi  
ZTo (more content not   
included)...        Normal                                  [facility]  
   
                                                    C MRSA Screenon 2023   
   
                      C MRSA Screen Negative   [facility]  
   
                                        Comment on above:   Performed By: #### 1  
[id number] ####[facility] (DEFAULT)[address] [place] [address]   
   
                                                    Provider Orderson 2023  
   
   
                                        Provider Orders     [id number]  
52  
[id number]  
TGTIFF              Normal                                  [facility]  
   
                                                    .Auto Diff 1on 2023   
   
                      Auto Mono % 10 %       Normal     12       [facility]  
   
                                        Comment on above:   Performed By: #### 1  
[id number], [id number] ####[facility] (DEFAULT)[address] [place] [address]   
   
                      Baso Abs#  0.1 x10    Normal     0.0-0.2    [facility]  
   
                                        Comment on above:   Performed By: #### 1  
[id number], [id number], [id number] ####[facility] (DEFAULT)[address] [place] [address]   
   
                                                    Basophils/100 WBC   
(Bld)           0.5 %           Normal          0.2-2.0         [facility]  
   
                                        Comment on above:   Performed By: #### 1  
[id number], [id number] ####[facility] (DEFAULT)[address] [place] [address]   
   
                      Eos Abs#   0.1 x10    Normal     0.0-0.4    [facility]  
   
                                        Comment on above:   Performed By: #### 1  
[id number], [id number] ####[facility] (DEFAULT)[address] [place] [address]   
   
                                                    Eosinophils/100 WBC   
(Bld)           1.1 %           Normal          0.9-4.0         [facility]  
   
                                        Comment on above:   Performed By: #### 1  
[id number], [id number] ####[facility] (DEFAULT)[address] [place] [address]   
   
                      Lymph Abs# 2.1 x10    Normal     1.3-2.9    [facility]  
   
                                        Comment on above:   Performed By: #### 1  
[id number], [id number] ####[facility] (DEFAULT)[address] [place] [address]   
   
                                                    Lymphocytes/100 WBC   
(Bld)           18 %            Normal          14-48           [facility]  
   
                                        Comment on above:   Performed By: #### 1  
[id number], [id number], [id number] ####[facility] (DEFAULT)[address] [place] [address]   
   
                      Mono Abs#  1.2 x10    High       0.0-0.8    [facility]  
   
                                        Comment on above:   Performed By: #### 1  
[id number], [id number], [id number] ####[facility] (DEFAULT)[address] [place] [address]   
   
                      Neut Abs#  8.6 x10    Normal     1.5-9.2    [facility]  
   
                                        Comment on above:   Performed By: #### 1  
[id number], [id number], [id number] ####[facility] (DEFAULT)[address] [place] [address]   
   
                                                    Neutrophils/100 WBC   
(Bld)           71 %            Normal          44-88           [facility]  
   
                                        Comment on above:   Performed By: #### 1  
[id number], [id number] ####[facility] (DEFAULT)[address] [place] [address]   
   
                                                    BMP Standardon 2023   
   
                                eGFR Non AA     49 mL/min/1.73m2 Invalid   
Interpretation Code                                 [facility]  
   
                                        Comment on above:   Performed By: #### 1  
[id number], [id number] ####[facility] (DEFAULT)[address] [place] [address]   
   
                                eGFR AA         59 mL/min/1.73m2 Invalid   
Interpretation Code                                 [facility]  
   
                                        Comment on above:   Performed By: #### 1  
[id number], [id number], [id number] ####[facility] (DEFAULT)[address] [place] [address]   
   
                                                    Anion gap   
[Moles/Vol]     12.9 mmol/L     Normal          5.0-19.0        [facility]  
   
                                        Comment on above:   Performed By: #### 1  
[id number], [id number], [id number] ####[facility] (DEFAULT)[address] [place] [address]   
   
                      Calcium [Mass/Vol] 9.2 mg/dL  Normal     8.9-10.3   [facility]  
   
                                        Comment on above:   Performed By: #### 1  
[id number], [id number], [id number] ####[facility] (DEFAULT)[address] [place] [address]   
   
                                                    Chloride   
[Moles/Vol]     98 mmol/L       Low             101-111         [facility]  
   
                                        Comment on above:   Performed By: #### 1  
[id number], [id number], [id number] ####[facility] (DEFAULT)[address] [place] [address]   
   
                      CO2 [Moles/Vol] 28 mmol/L  Normal     21-32      [facility]  
   
                                        Comment on above:   Performed By: #### 1  
[id number], [id number], [id number] ####[facility] (DEFAULT)[address] [place] [address]   
   
                                                    Creatinine   
[Mass/Vol]      1.12 mg/dL      Normal          0.60-1.30       [facility]  
   
                                        Comment on above:   Performed By: #### 1  
[id number], [id number], [id number] ####[facility] (DEFAULT)[address] [place] [address]   
   
                      Glucose [Mass/Vol] 110.0 mg/dL Normal     74.0-118.0 [facility]  
   
                                        Comment on above:   Performed By: #### 1  
[id number], [id number], [id number] ####[facility] (DEFAULT)[address] [place] [address]   
   
                                Osmolality      273 mOsm/L      Invalid   
Interpretation Code                                 [facility]  
   
                                        Comment on above:   Performed By: #### 1  
[id number], [id number], [id number] ####[facility] (DEFAULT)[address] [place] [address]   
   
                                                    Potassium   
[Moles/Vol]     3.9 mmol/L      Normal          3.6-5.1         [facility]  
   
                                        Comment on above:   Performed By: #### 1  
[id number], [id number], [id number] ####[facility] (DEFAULT)[address] [place] [address]   
   
                      Sodium [Moles/Vol] 135.0 mmol/L Low        136.0-144.0 [facility]  
   
                                        Comment on above:   Performed By: #### 1  
[id number], [id number], [id number] ####[facility] (DEFAULT)[address] [place] [address]   
   
                                                    Urea nitrogen   
[Mass/Vol]      20 mg/dL        Normal          8-26            [facility]  
   
                                        Comment on above:   Performed By: #### 1  
[id number], [id number], [id number] ####[facility] (DEFAULT)[address] [place] [address]   
   
                                                    Urea   
nitrogen/Creatinine   
[Mass ratio]    17.8 mg/mg      High            4.6-16.2        [facility]  
   
                                        Comment on above:   Performed By: #### 1  
[id number], [id number], [id number] ####[facility] (DEFAULT)[address] [place] [address]   
   
                                                    CBC w/ Auto Diffon   
3   
   
                                                    Erythrocyte   
distribution width   
(RBC) [Ratio]   12.7 %          Normal          11.5-15.0       [facility]  
   
                                        Comment on above:   Performed By: #### 1  
[id number], [id number], [id number] ####[facility] (DEFAULT)[address] [place] [address]   
   
                                                    Hematocrit (Bld)   
[Volume fraction] 38.4 %          Normal          33.7-40.4       [facility]  
   
                                        Comment on above:   Performed By: #### 1  
[id number], [id number], [id number] ####[facility] (DEFAULT)[address] [place] [address]   
   
                                                    Hemoglobin (Bld)   
[Mass/Vol]      12.9 g/dL       Normal          11.3-15.9       [facility]  
   
                                        Comment on above:   Performed By: #### 1  
[id number], [id number], [id number] ####[facility] (DEFAULT)[address] [place] [address]   
   
                                Man Diff?       Auto            Invalid   
Interpretation Code                                 [facility]  
   
                                        Comment on above:   Performed By: #### 1  
[id number], [id number], [id number] ####[facility] (DEFAULT)[address] [place] [address]   
   
                                                    MCH (RBC) [Entitic   
mass]           31 pg           Normal          24-34           [facility]  
   
                                        Comment on above:   Performed By: #### 1  
[id number], [id number] ####[facility] (DEFAULT)[address] [place] [address]   
   
                                                    MCHC (RBC)   
[Mass/Vol]      34 g/dL         Normal          26-37           [facility]  
   
                                        Comment on above:   Performed By: #### 1  
[id number], [id number], [id number] ####[facility] (DEFAULT)[address] [place] [address]   
   
                                                    MCV (RBC) [Entitic   
vol]            92 fL           Normal                    [facility]  
   
                                        Comment on above:   Performed By: #### 1  
[id number], [id number], [id number] ####[facility] (DEFAULT)[address] [place] [address]   
   
                      Platelet   403 x10    Normal     138-427    [facility]  
   
                                        Comment on above:   Performed By: #### 1  
[id number], [id number], [id number] ####[facility] (DEFAULT)[address] [place] [address]   
   
                                                    Platelet mean   
volume (Bld)   
[Entitic vol]   8.4 fL          Normal          6.3-10.2        [facility]  
   
                                        Comment on above:   Performed By: #### 1  
[id number], [id number], [id number] ####[facility] (DEFAULT)[address] [place] [address]   
   
                      RBC        4.18 x10   Normal     3.70-5.30  [facility]  
   
                                        Comment on above:   Performed By: #### 1  
[id number], [id number], [id number] ####[facility] (DEFAULT)[address] [place] [address]   
   
                      WBC        12.1 x10   High       3.5-10.5   [facility]  
   
                                        Comment on above:   Performed By: #### 1  
[id number], [id number], [id number] ####[facility] (DEFAULT)[address] [place] [address]   
   
                                                    UA [id number] 2023   
   
                      UA Bacteria 1+         Normal                [facility]  
   
                                        Comment on above:   Order Comment: Urina  
lysis Microscopic order added on by   
Discern   
Expert Rules system.   
   
                                                            Performed By: #### 1  
[id number], [id number], [id number] ####  
[facility] (DEFAULT)  
[address] [place] [address]   
   
                      UA Gran Cast 0-5        [facility]  
   
                                        Comment on above:   Order Comment: Urina  
lysis Microscopic order added on by   
Discern   
Expert Rules system.   
   
                                                            Performed By: #### 1  
[id number], [id number], [id number] ####  
[facility] (DEFAULT)  
[address] [place] [address]   
   
                      UA Hyal Cast 0-5        Normal                [facility]  
   
                                        Comment on above:   Order Comment: Urina  
lysis Microscopic order added on by   
Discern   
Expert Rules system.   
   
                                                            Performed By: #### 1  
[id number], [id number], [id number] ####  
[facility] (DEFAULT)  
[address] [place] [address]   
   
                      UA Mucous  1+         Normal                [facility]  
   
                                        Comment on above:   Order Comment: Urina  
lysis Microscopic order added on by   
Discern   
Expert Rules system.   
   
                                                            Performed By: #### 1  
[id number], [id number], [id number] ####  
[facility] (DEFAULT)  
[address]  
[place] [address]   
   
                      UA RBC     5-10       [facility]  
   
                                        Comment on above:   Order Comment: Urina  
lysis Microscopic order added on by   
Discern   
Expert Rules system.   
   
                                                            Performed By: #### 1  
[id number], [id number], [id number] ####  
[facility] (DEFAULT)  
[address] [place] [address]   
   
                      UA Squam Epi Few        [facility]  
   
                                        Comment on above:   Order Comment: Urina  
lysis Microscopic order added on by   
Discern   
Expert Rules system.   
   
                                                            Performed By: #### 1  
[id number], [id number], [id number] ####  
[facility] (DEFAULT)  
[address] [place] [address]   
   
                      UA WBC     60-70      [facility]  
   
                                        Comment on above:   Order Comment: Urina  
lysis Microscopic order added on by   
monEchelle   
Expert Rules system.   
   
                                                            Performed By: #### 1  
[id number], [id number], [id number] ####  
[facility] (DEFAULT)  
[address] [place] [address]   
   
                                                    UA w Culture if Ind Standard  
on 2023   
   
                      Breakpoint UA ********   [facility]  
   
                                        Comment on above:   Performed By: #### 1  
[id number], [id number], [id number] ####  
[facility] (DEFAULT)  
[address] [place] [address]   
   
                      Color (U)  Yellow     [facility]  
   
                                        Comment on above:   Performed By: #### 1  
[id number], [id number], [id number] ####  
[facility] (DEFAULT)  
[address] [place] [address]   
   
                                Culture?        Indicated       Invalid   
Interpretation Code                                 [facility]  
   
                                        Comment on above:   Result Comment: Resu  
lt created by rule GL_MAGR_ADD_UA_CULT   
Result   
created by rule GL_MAGR_ADD_UA_CULT Result created by rule   
GL_MAGR_ADD_UA_CULT1 Result created by rule GL_MAGR_ADD_UA_CULT   
   
                                                            Performed By: #### 1  
[id number], [id number], [id number] ####  
[facility] (DEFAULT)  
[address] [place] [address]   
   
                                                    Glucose (U)   
[Mass/Vol]      Negative        [facility]  
   
                                        Comment on above:   Performed By: #### 1  
[id number], [id number], [id number] ####  
[facility] (DEFAULT)  
[address] [place] [address]   
   
                      Ketones Ql (U) Negative   Normal                [facility]  
   
                                        Comment on above:   Performed By: #### 1  
[id number], [id number], [id number] ####  
[facility] (DEFAULT)  
[address] [place] [address]   
   
                                Micro?          Indicated       Invalid   
Interpretation Code                                 [facility]  
   
                                        Comment on above:   Result Comment: Resu  
lt created by rule GL_MAGR_ADD_UA_MICRO   
   
                                                            Performed By: #### 1  
[id number], [id number], [id number] ####  
[facility] (DEFAULT)  
[address] [place] [address]   
   
                      UA Bilirubin Negative   Normal                [facility]  
   
                                        Comment on above:   Performed By: #### 1  
[id number], [id number], [id number] ####  
[facility] (DEFAULT)  
[address] [place] [address]   
   
                      UA Blood   TRACE      Abnormal   NEGATIVE   [facility]  
   
                                        Comment on above:   Performed By: #### 1  
[id number], [id number], [id number] ####  
[facility] (DEFAULT)  
[address] [place] [address]   
   
                      UA Clarity CLOUDY     Abnormal   CLEAR      [facility]  
   
                                        Comment on above:   Performed By: #### 1  
[id number], [id number], [id number] ####  
[facility] (DEFAULT)  
[address] [place] [address]   
   
                      UA Leuk Est LARGE      Abnormal   NEGATIVE   [facility]  
   
                                        Comment on above:   Performed By: #### 1  
[id number], [id number], [id number] ####  
[facility] (DEFAULT)  
[address] [place] [address]   
   
                      UA Nitrite Negative   Normal     NEGATIVE   [facility]  
   
                                        Comment on above:   Performed By: #### 1  
[id number], [id number], [id number] ####  
[facility] (DEFAULT)  
[address] [place] [address]   
   
                      UA pH      6.0        Normal     5-8        [facility]  
   
                                        Comment on above:   Performed By: #### 1  
[id number], [id number], [id number] ####  
[facility] (DEFAULT)  
[address] [place] [address]   
   
                      UA Protein Negative   Normal     NEGATIVE   [facility]  
   
                                        Comment on above:   Performed By: #### 1  
[id number], [id number], [id number] ####  
[facility] (DEFAULT)  
[address] [place] [address]   
   
                      UA Spec Grav 1.025      Normal     1.001-1.035 [facility]  
   
                                        Comment on above:   Performed By: #### 1  
[id number], [id number], [id number] ####  
[facility] (DEFAULT)  
[address] [place] [address]   
   
                      UA Urobilinogen 1.0 mg/dL  Normal     0.2-1.0    [facility]  
   
                                        Comment on above:   Performed By: #### 1  
[id number], [id number], [id number] ####  
[facility] (DEFAULT)  
[address] [place] [address]   
   
                      Urine Source Clean Catch Normal                [facility]  
   
                                        Comment on above:   Performed By: #### 1  
[id number], [id number], [id number] ####  
[facility] (DEFAULT)  
[address] [place] [address]   
   
                                                    CBC AUTO DIFFon 2023   
   
                      BASO #     0.0 103/ul Normal     0.0-0.1    [facility]  
   
                                        Comment on above:   Performed By: #### C  
BC ####  
[facility]  
[address] [place] [address]  
Dr. Nikole Jacobs   
   
                                                    Basophils/100 WBC   
(Bld)           0.3 %           Normal          0.2-2.0         [facility]  
   
                                        Comment on above:   Performed By: #### C  
BC ####  
[facility]  
[address] [place] [address]  
Dr. Nikole Jacobs   
   
                      EO #       0.1 103/ul Normal     0.0-0.7    [facility]  
   
                                        Comment on above:   Performed By: #### C  
BC ####  
[facility]  
[address] [place] [address]  
Dr. Nikole Jacobs   
   
                                                    Eosinophils/100 WBC   
(Bld)           0.5 %           Critically low  0.9-7.0         [facility]  
   
                                        Comment on above:   Performed By: #### C  
BC ####  
[facility]  
[address] [place] [address]  
Dr. Nikole Jacobs   
   
                                                    Erythrocyte   
distribution width   
(RBC) [Ratio]   12.7 %          Normal          11.0-15.0       [facility]  
   
                                        Comment on above:   Performed By: #### C  
BC ####  
[facility]  
[address] [place] [address]  
Dr. Nikole Jacobs   
   
                                                    Hematocrit (Bld)   
[Volume fraction] 36.0 %          Normal          36.0-48.0       [facility]  
   
                                        Comment on above:   Performed By: #### C  
BC ####  
[facility]  
[address] [place] [address]  
Dr. Nikole Jacobs   
   
                                                    Hemoglobin (Bld)   
[Mass/Vol]      12.0 g/dL       Normal          12.0-16.0       [facility]  
   
                                        Comment on above:   Performed By: #### C  
BC ####  
[facility]  
[address] [place] [address]  
Dr. Nikole Jacobs   
   
                      IG #       0.05 10e3/ul Critically high 0.00-0.03  [facility]  
   
                                        Comment on above:   Performed By: #### C  
BC ####  
[facility]  
[address] [place] [address]  
Dr. Nikole Jacobs   
   
                      IG %       0.4 %      Normal     0.0-0.5    [facility]  
   
                                        Comment on above:   Performed By: #### C  
BC ####  
[facility]  
[address] [place] [address]  
Dr. Nikole Jacobs   
   
                      LYMPH #    1.5 103/ul Normal     1.2-3.8    [facility]  
   
                                        Comment on above:   Performed By: #### C  
BC ####  
[facility]  
[address] [place] [address]  
Dr. Nikole Jacobs   
   
                                                    Lymphocytes/100 WBC   
(Bld)           11.8 %          Critically low  20.5-60.0       [facility]  
   
                                        Comment on above:   Performed By: #### C  
BC ####  
[facility]  
[address] [place] [address]  
Dr. Nikole Jacobs   
   
                      MANUAL DIFF REQ NO         Normal                [facility]  
   
                                        Comment on above:   Performed By: #### C  
BC ####  
[facility]  
[address] [address]  
Dr. Nikole Jacobs   
   
                                                    MCH (RBC) [Entitic   
mass]           31.7 pg         Normal          26.7-34.0       [facility]  
   
                                        Comment on above:   Performed By: #### C  
BC ####  
[facility]  
[address] [address]  
Dr. Nikole Jacobs   
   
                                                    MCHC (RBC)   
[Mass/Vol]      33.3 g/dL       Normal          29.9-35.2       [facility]  
   
                                        Comment on above:   Performed By: #### C  
BC ####  
[facility]  
[address] [address]  
Dr. Nikole Jacobs   
   
                                                    MCV (RBC) [Entitic   
vol]            95.2 fL         Normal          81.0-99.0       [facility]  
   
                                        Comment on above:   Performed By: #### C  
BC ####  
[facility]  
[address] [place] [address]  
Dr. Nikole Jacobs   
   
                      MONO #     1.1 103/ul Critically high 0.3-0.8    [facility]  
   
                                        Comment on above:   Performed By: #### C  
BC ####  
[facility]  
[address] [address]  
Dr. Nikole Jacobs   
   
                                                    Monocytes/100 WBC   
(Bld)           8.2 %           Normal          1.7-12.0        [facility]  
   
                                        Comment on above:   Performed By: #### C  
BC ####  
[facility]  
[address] [address]  
Dr. Nikole Jacobs   
   
                      NEUT #     10.2 103/ul Critically high 1.4-6.5    [facility]  
   
                                        Comment on above:   Performed By: #### C  
BC ####  
[facility]  
[address] [address]  
Dr. Nikole Jacobs   
   
                                                    Neutrophils/100 WBC   
(Bld)           78.8 %          Critically high 43.0-75.0       [facility]  
   
                                        Comment on above:   Performed By: #### C  
BC ####  
[facility]  
[address] [address]  
Dr. Nikole Jacobs   
   
                                                    Platelet mean   
volume (Bld)   
[Entitic vol]   9.3 fL          Critically low  9.5-13.5        [facility]  
   
                                        Comment on above:   Performed By: #### C  
BC ####  
[facility]  
[address] [place], [place] [address]  
Dr. Nikole Jacobs   
   
                      PLT        435 103/ul Normal     150-450    [facility]  
   
                                        Comment on above:   Performed By: #### C  
BC ####  
[facility]  
[address] [place], [place] [address]  
Dr. Nikole Jacobs   
   
                      RBC        3.78 106/ul Critically low 4.20-5.40  [facility]  
   
                                        Comment on above:   Performed By: #### C  
BC ####  
[facility]  
[address] [place], [place] [address]  
Dr. Nikole Jacobs   
   
                      WBC        13.0 103/ul Critically high 4.0-11.0   [facility]  
   
                                        Comment on above:   Performed By: #### C  
BC ####  
[facility]  
[address] [place] [address]  
Dr. Nikole Jacobs   
   
                                                    CT HEAD WO CONon 2023   
   
                                        CT HEAD WO CON      EXAMINATION: CT HEAD  
   
WO CON  
HISTORY: Syncope  
COMPARISON: No   
relevant comparison   
available.  
TECHNIQUE: Axial CT   
images were obtained   
without IV contrast.   
Dose reduction  
techniques were   
achieved by using   
automated exposure   
control and/or   
adjustment  
of mA and/or kV   
according to patient   
size and/or use of   
iterative   
reconstruction  
technique.  
FINDINGS:  
BRAIN: No edema,   
hemorrhage, mass,   
acute infarction, or   
inappropriate atrophy.  
CSF SPACES: No   
hydrocephalus,   
subarachnoid   
hemorrhage, or mass.   
Appropriate for  
age.  
SKULL: No fracture,   
mass, or other   
significant visible   
lesion.  
SINUSES: No   
significant mucosal   
thickening or fluid on   
the limited views.  
ORBITS: No appreciable   
abnormality on the   
limited views.  
OTHER: Empty pituitary   
fossa.  
IMPRESSION:  
1. No intracranial   
hemorrhage. Normal CT   
appearance of brain.  
2. Empty pituitary   
fossa; pituitary   
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  
3. No fracture of the   
calvarium or scalp   
hematoma.  
Electronically   
authenticated by:   
ZAC SHERMAN Date:   
2023 15:30    Normal                                  [facility]  
   
                                                    CULTURE URINEon 2023   
   
                                        CULTURE URINE       Culture Observations  
:  
LIGHT GROWTH OF MIXED   
GENITAL ARMAND. NO   
POTENTIAL PATHOGENS   
SEEN.               Normal                                  [facility]  
   
                                        Comment on above:   Performed By: #### C  
MP ####  
[facility]  
[address] [place] [address]  
Dr. Nikole Jacobs   
   
                                                    ER URINE PROFILEon   
3   
   
                      Bilirubin Ql (U) Negative   Normal     NEGATIVE   [facility]  
   
                                        Comment on above:   Performed By: #### U  
MICRO, ERUR ####  
[facility]  
[address] [address]  
Dr. Nikole Jacobs   
   
                      Clarity (U) CLEAR      Normal     CLEAR      [facility]  
   
                                        Comment on above:   Performed By: #### U  
MICRO, ERUR ####  
[facility]  
[address] [address]  
Dr. Nikole Jacobs   
   
                      Color (U)  YELLOW     Normal     YELLOW     [facility]  
   
                                        Comment on above:   Performed By: #### U  
MICRO, ERUR ####  
[facility]  
[address] [address]  
Dr. Nikole Jacobs   
   
                                        ERUAHD              A micrscopic   
examination will be   
performed if   
indicated.          Normal                                  [facility]  
   
                                        Comment on above:   Performed By: #### U  
MICRO, ERUR ####  
[facility]  
[address] [address]  
Dr. Nikole Jacobs   
   
                      Glucose Ql (U) 250 mg/dl  Abnormal   NEGATIVE   [facility]  
   
                                        Comment on above:   Performed By: #### U  
MICRO, ERUR ####  
[facility]  
[address] [address]  
Dr. Nikole Jacobs   
   
                      Hemoglobin Ql (U) Negative   Normal     NEGATIVE   [facility]  
   
                                        Comment on above:   Performed By: #### U  
MICRO, ERUR ####  
[facility]  
[address] [place] [address]  
Dr. Nikole Jacobs   
   
                      Ketones Ql (U) Negative   Normal     NEGATIVE   [facility]  
   
                                        Comment on above:   Performed By: #### U  
MICRO, ERUR ####  
[facility]  
[address] [address]  
Dr. Nikole Jacobs   
   
                      LEUKOCYTES MODERATE   Abnormal   NEGATIVE   [facility]  
   
                                        Comment on above:   Performed By: #### U  
MICRO, ERUR ####  
[facility]  
[address] [address]  
Dr. Nikole Jacobs   
   
                      Nitrite Ql (U) Negative   Normal     NEGATIVE   [facility]  
   
                                        Comment on above:   Performed By: #### U  
MICRO, ERUR ####  
[facility]  
[address] [address]  
Dr. Nikole Jacobs   
   
                      pH (U)     5.5 [pH]   Normal     5-9        [facility]  
   
                                        Comment on above:   Performed By: #### U  
MICRO, ERUR ####  
[facility]  
[address] [place] [address]  
Dr. Nikole Jacobs   
   
                      SPEC GRAVITY 1.025      Normal     1.005-<=1.025 [facility]  
   
                                        Comment on above:   Performed By: #### U  
MICRO, ERUR ####  
[facility]  
[address] [place] [address]  
Dr. Nikole Jacobs   
   
                          UA PROTEIN   Negative     Normal       NEGATIVE/   
TRACE                                   [facility]  
   
                                        Comment on above:   Performed By: #### U  
MICRO, ERUR ####  
[facility]  
[address] [place] [address]  
Dr. Nikole Jacobs   
   
                      UR MICRO IND INDICATED  Normal                [facility]  
   
                                        Comment on above:   Performed By: #### U  
MICRO, ERUR ####  
[facility]  
[address] [place] [address]  
Dr. Nikole Jacobs   
   
                      Urobilinogen Qn (U) 0.2 {Enma'U}/dL Normal     0.2 - 1.  
0  [facility]  
   
                                        Comment on above:   Performed By: #### U  
MICRO, ERUR ####  
[facility]  
[address] [place] [address]  
Dr. Nikole Jacobs   
   
                                                    POINT OF CARE GLUCOSEon    
   
                      Glucose [Mass/Vol] 142 mg/dL  Critically high      [facility]  
   
                                        Comment on above:   Performed By: #### C  
BC ####  
[facility]  
[address] [place] [address]  
Dr. Nikole Jacobs   
   
                                                    PROF 14(COMP METB)on   
023   
   
                      Albumin [Mass/Vol] 3.6 g/dL   Normal     3.4-5.0    [facility]  
   
                                        Comment on above:   Performed By: #### C  
BROOK HSTROPN ####  
[facility]  
[address] [place] [address]  
Dr. Nikole Jacobs   
   
                                                    Albumin/Globulin   
[Mass ratio]    1.2 {ratio}     Normal                          [facility]  
   
                                        Comment on above:   Performed By: #### C  
BROOK HSTROPN ####  
[facility]  
[address] [address]  
Dr. Nikole Jacobs   
   
                                                    ALP [Catalytic   
activity/Vol]   77 U/L          Normal                    [facility]  
   
                                        Comment on above:   Performed By: #### C  
MP, HSTROPN ####  
[facility]  
[address] [address]  
Dr. Nikole Jacobs   
   
                                                    ALT [Catalytic   
activity/Vol]   39 U/L          Normal          14-59           [facility]  
   
                                        Comment on above:   Performed By: #### C  
MP, HSTROPN ####  
[facility]  
[address] [place] [address]  
Dr. Nikole Jacobs   
   
                                                    Anion gap   
[Moles/Vol]     13.2 mmol/L     Normal                          [facility]  
   
                                        Comment on above:   Performed By: #### C  
BROOK, HSTROPN ####  
[facility]  
[address] [place] [address]  
Dr. Nikole Jacobs   
   
                                                    AST [Catalytic   
activity/Vol]   20 U/L          Normal          15-37           [facility]  
   
                                        Comment on above:   Performed By: #### C  
BROOK, HSTROPN ####  
[facility]  
[address] [place] [address]  
Dr. Nikole Jacobs   
   
                                                    Bilirubin   
[Mass/Vol]      0.4 mg/dL       Normal          0.2-1.0         [facility]  
   
                                        Comment on above:   Performed By: #### C  
BROOK, HSTROPN ####  
[facility]  
[address] [place] [address]  
Dr. Nikole Jacobs   
   
                      Calcium [Mass/Vol] 9.2 mg/dL  Normal     8.5-10.1   [facility]  
   
                                        Comment on above:   Performed By: #### C  
MP, HSTROPN ####  
[facility]  
[address] [place] [address]  
Dr. Nikole Jacobs   
   
                                                    Chloride   
[Moles/Vol]     98 mmol/L       Normal                    [facility]  
   
                                        Comment on above:   Performed By: #### C  
MP, HSTROPN ####  
[facility]  
[address] [place] [address]  
Dr. Nikole Jacobs   
   
                      CO2 [Moles/Vol] 30.3 mmol/L Normal     21.0-32.0  [facility]  
   
                                        Comment on above:   Performed By: #### C  
MP, HSTROPN ####  
[facility]  
[address] [address]  
Dr. Nikole Jacobs   
   
                                                    Creatinine   
[Mass/Vol]      1.58 mg/dL      Critically high 0.55-1.02       [facility]  
   
                                        Comment on above:   Performed By: #### C  
MP, HSTROPN ####  
[facility]  
[address] [address]  
Dr. Nikole Jacobs   
   
                      EGFR-AF AMERICAN 40 mL/min/1.73m2 Critically low >=60       
  [facility]  
   
                                        Comment on above:   Performed By: #### C  
MP, HSTROPN ####  
[facility]  
[address] [place] [address]  
Dr. Nikole Jacobs   
   
                                                    EGFR-NON AF   
AMERICAN        33 mL/min/1.73m2 Critically low  >=60            [facility]  
   
                                        Comment on above:   Performed By: #### C  
MP, HSTROPN ####  
[facility]  
[address] [address]  
Dr. Nikole Jacobs   
   
                                                    Globulin (S)   
[Mass/Vol]      2.9 g/dL        Normal                          [facility]  
   
                                        Comment on above:   Performed By: #### C  
MP, HSTROPN ####  
[facility]  
[address] [place] [address]  
Dr. Nikole Jacobs   
   
                      Glucose [Mass/Vol] 156 mg/dL  Critically high      T  
[facility]  
   
                                        Comment on above:   Performed By: #### C  
MP, HSTROPN ####  
[facility]  
[address] [address]  
Dr. Nikole Jacobs   
   
                                                    Potassium   
[Moles/Vol]     3.5 mmol/L      Normal          3.5-5.1         [facility]  
   
                                        Comment on above:   Performed By: #### C  
MP, HSTROPN ####  
[facility]  
[address] [place] [address]  
Dr. Nikole Jacobs   
   
                      Protein [Mass/Vol] 6.5 g/dL   Normal     6.4-8.2    [facility]  
   
                                        Comment on above:   Performed By: #### C  
MP, HSTROPN ####  
[facility]  
[address] [place] [address]  
Dr. Nikole Jacobs   
   
                      Sodium [Moles/Vol] 138 mmol/L Normal     136-145    [facility]  
   
                                        Comment on above:   Performed By: #### C  
MP, HSTROPN ####  
[facility]  
[address] [place] [address]  
Dr. Nikole Jacobs   
   
                                                    Urea nitrogen   
[Mass/Vol]      26.0 mg/dL      Critically high 7.0-18.0        [facility]  
   
                                        Comment on above:   Performed By: #### C  
MP, HSTROPN ####  
[facility]  
[address] [place] [address]  
Dr. Nikole Jacobs   
   
                                                    Urea   
nitrogen/Creatinine   
[Mass ratio]    16.5 mg/mg      Normal                          [facility]  
   
                                        Comment on above:   Performed By: #### C  
MP, HSTROPN ####  
[facility]  
[address] [place] [address]  
Dr. Nikole Jacobs   
   
                                                    TROPONIN, HIGH SENSITIVITYon  
 2023   
   
                      HSTROP     35.4 pg/mL Normal     4.0-51.3   [facility]  
   
                                        Comment on above:   Result Comment: CUT-  
OFF POINTS HAVE BEEN ESTABLISHED BASED ON   
THE   
FOURTH UNIVERSAL DEFINITIONS OF MYOCARDIAL  
INFARCTION. THE UPPER REFERENCE LIMIT (URL) OF TROPONIN, DEFINED   
AS THE 99TH PERCENTILE OF  
cTnI DISTRIBUTION IN A REFERENCE POPULATION, HAS BEEN CONFIRMED AS   
THE DECISION THRESHOLD  
FOR MI DIAGNOSIS.   
   
                                                            Performed By: #### H  
STROPN ####  
[facility]  
[address] [place] [address]  
Dr. Nikole Jacobs   
   
                      HSTROP     36.5 pg/mL Normal     4.0-51.3   [facility]  
   
                                        Comment on above:   Result Comment: CUT-  
OFF POINTS HAVE BEEN ESTABLISHED BASED ON   
THE   
FOURTH UNIVERSAL DEFINITIONS OF MYOCARDIAL  
INFARCTION. THE UPPER REFERENCE LIMIT (URL) OF TROPONIN, DEFINED   
AS THE 99TH PERCENTILE OF  
cTnI DISTRIBUTION IN A REFERENCE POPULATION, HAS BEEN CONFIRMED AS   
THE DECISION THRESHOLD  
FOR MI DIAGNOSIS.   
   
                                                            Performed By: #### C  
MP, HSTROPN ####  
[facility]  
[address] [place] [address]  
Dr. Nikole Jacobs   
   
                                                    URINE MICROSCOPIC ONLYon    
   
                      BACTERIA   TRACE      Abnormal   NONE SEEN  [facility]  
   
                                        Comment on above:   Performed By: #### U  
MICRO, ERUR ####  
[facility]  
[address] [place] [address]  
Dr. Nikole Jacobs   
   
                                                    Bacteria identified   
Cx Nom (U)      INDICATED       Normal                          [facility]  
   
                                        Comment on above:   Performed By: #### U  
MICRO, ERUR ####  
[facility]  
[address] [place] [address]  
Dr. Nikole Jacobs   
   
                      CAST       NONE SEEN  Normal     NONE SEEN  [facility]  
   
                                        Comment on above:   Performed By: #### U  
MICRO, ERUR ####  
[facility]  
[address] [place] [address]  
Dr. Nikole Jacobs   
   
                                                    Crystals LM Nom   
(Urine sed)     SEEN            Abnormal        NONE SEEN       [facility]  
   
                                        Comment on above:   Performed By: #### U  
MICRO, ERUR ####  
[facility]  
[address] [place] [address]  
Dr. Nikole Jacobs   
   
                                                    Epithelial cells LM   
Ql (Urine sed)      MODERATE            Abnormal            NONE SEEN   
/RARE                                   [facility]  
   
                                        Comment on above:   Performed By: #### U  
MICRO, ERUR ####  
[facility]  
[address] [place] [address]  
Dr. Nikole Jcaobs   
   
                      MUCOUS     NONE SEEN  Normal     NONE SEEN  [facility]  
   
                                        Comment on above:   Performed By: #### U  
MICRO, ERUR ####  
[facility]  
[address] [place] [address]  
Dr. Nikole Jacobs   
   
                      RBC        2-5        Abnormal   0-2        [facility]  
   
                                        Comment on above:   Performed By: #### U  
MICRO, ERUR ####  
[facility]  
[address] [place] [address]  
Dr. Nikole Jacobs   
   
                      WBC        10-20      Abnormal   NONE SEEN  [facility]  
   
                                        Comment on above:   Performed By: #### U  
MICRO, ERUR ####  
[facility]  
[address] [place] [address]  
Dr. Nikole Jacobs   
   
                                                    CULTURE URINEon 2023   
   
                                        CULTURE URINE       Culture Observations  
:  
MODERATE GROWTH OF   
MIXED GENITAL ARMAND.   
NO POTENTIAL PATHOGENS   
SEEN.               Normal                                  [facility]  
   
                                        Comment on above:   Performed By: #### C  
MP ####  
[facility]  
[address] [place] [address]  
Dr. Nikole Jacobs   
   
                                                    UA RANDOM W/MICROSCOPICon    
   
                      BACTERIA   SMALL      Abnormal   NONE SEEN  [facility]  
   
                                        Comment on above:   Performed By: #### U  
MICRO, ERUR ####  
[facility]  
[address] [address]  
Dr. Nikole Jacobs   
   
                      Bilirubin Ql (U) MODERATE   Abnormal   NEGATIVE   [facility]  
   
                                        Comment on above:   Performed By: #### U  
MICRO, ERUR ####  
[facility]  
[address] [address]  
Dr. Nikole Jacobs   
   
                      CAST       NONE SEEN  Normal     NONE SEEN  [facility]  
   
                                        Comment on above:   Performed By: #### U  
MICRO, ERUR ####  
[facility]  
[address] [address]  
Dr. Nikole Jacobs   
   
                      Clarity (U) CLEAR      Normal     CLEAR      [facility]  
   
                                        Comment on above:   Performed By: #### U  
MICRO, ERUR ####  
[facility]  
[address] [address]  
Dr. Nikole Jacobs   
   
                      Color (U)  LT. YELLOW Normal     YELLOW     [facility]  
   
                                        Comment on above:   Performed By: #### U  
MICRO, ERUR ####  
[facility]  
[address] [address]  
Dr. Nikole Jacobs   
   
                                                    Crystals LM Nom   
(Urine sed)     NONE SEEN       Normal          NONE SEEN       [facility]  
   
                                        Comment on above:   Performed By: #### U  
MICRO, ERUR ####  
[facility]  
[address] [place] [address]  
Dr. Nikole Jacobs   
   
                                                    Epithelial cells LM   
Ql (Urine sed)      FEW                 Abnormal            NONE SEEN   
/RARE                                   [facility]  
   
                                        Comment on above:   Performed By: #### U  
MICRO, ERUR ####  
[facility]  
[address] [address]  
Dr. Nikole Jacobs   
   
                      Glucose Ql (U) Negative   Normal     NEGATIVE   [facility]  
   
                                        Comment on above:   Performed By: #### U  
MICRO, ERUR ####  
[facility]  
[address] [address]  
Dr. Nikole Jacobs   
   
                      Hemoglobin Ql (U) TRACE-INTACT Abnormal   NEGATIVE   [facility]  
   
                                        Comment on above:   Performed By: #### U  
MICRO, ERUR ####  
[facility]  
[address] [place] [address]  
Dr. Nikole Jacobs   
   
                      Ketones Ql (U) TRACE      Abnormal   NEGATIVE   [facility]  
   
                                        Comment on above:   Performed By: #### U  
MICRO, ERUR ####  
[facility]  
[address] [address]  
Dr. Nikole Jacobs   
   
                      LEUKOCYTES MODERATE   Abnormal   NEGATIVE   [facility]  
   
                                        Comment on above:   Performed By: #### U  
MICRO, ERUR ####  
[facility]  
[address] [address]  
Dr. Nikole Jacobs   
   
                      MUCOUS     NONE SEEN  Normal     NONE SEEN  [facility]  
   
                                        Comment on above:   Performed By: #### U  
MICRO, ERUR ####  
[facility]  
[address] [address]  
Dr. Nikole Jacobs   
   
                      Nitrite Ql (U) Negative   Normal     NEGATIVE   [facility]  
   
                                        Comment on above:   Performed By: #### U  
MICRO, ERUR ####  
[facility]  
[address] [place] [address]  
Dr. Nikole Jacobs   
   
                      pH (U)     5.5 [pH]   Normal     5-9        [facility]  
   
                                        Comment on above:   Performed By: #### U  
MICRO, ERUR ####  
[facility]  
[address] [address]  
Dr. Nikole Jacobs   
   
                      RBC        0-2        Normal     0-2        [facility]  
   
                                        Comment on above:   Performed By: #### U  
MICRO, ERUR ####  
[facility]  
[address] [place] [address]  
Dr. Nikole Jacobs   
   
                      SPEC GRAVITY >=1.030    Abnormal   1.005-<=1.025 [facility]  
   
                                        Comment on above:   Performed By: #### U  
MICRO, ERUR ####  
[facility]  
[address] [address]  
Dr. Nikole Jacobs   
   
                          UA PROTEIN   TRACE        Normal       NEGATIVE/   
TRACE                                   [facility]  
   
                                        Comment on above:   Performed By: #### U  
MICRO, ERUR ####  
[facility]  
[address] [address]  
Dr. Nikole Jacobs   
   
                      Urobilinogen Qn (U) 0.2 {Enma'U}/dL Normal     0.2 - 1.  
0  [facility]  
   
                                        Comment on above:   Performed By: #### U  
MICRO, ERUR ####  
[facility]  
[address] [place] [address]  
Dr. Nikole Jacobs   
   
                      WBC        20-50      Abnormal   NONE SEEN  [facility]  
   
                                        Comment on above:   Performed By: #### U  
MICRO, ERUR ####  
[facility]  
[address] [address]  
Dr. Nikole Jacobs   
   
                                                    US KIDNEYSon 2023   
   
                                        US KIDNEYS          EXAM: US KIDNEYS  
HISTORY: Abnormal   
renal function  
COMPARISON: None.  
TECHNIQUE: Multiple   
sonographic images of   
the kidneys and   
urinary bladder were  
obtained, supplemented   
with Doppler.  
FINDINGS: The right   
kidney measures 9.2 x   
4.4 x 6.8 cm, with   
cortical thickness  
of 15 mm. No focal   
abnormality is   
identified within.   
There is no evidence   
of  
hydronephrosis.  
The left kidney   
measures 9.8 x 4.7 x   
6.4 cm. The cortex   
measures 11 mm in  
thickness. An   
echogenic focus is   
present measuring 0.5   
x 0.4 x 0.4 cm. No   
focal  
abnormality is   
otherwise identified   
within. There is no   
evidence of  
hydronephrosis.  
The urinary bladder is   
not very well   
distended, with a   
calculated volume of   
27  
cc. Incidentally noted   
is a leiomyoma in the   
uterus measuring 2.0 x   
2.6 x 2.0  
cm.  
IMPRESSION:  
An echogenic focus in   
the upper pole of the   
left kidney may be a   
renal calculus.  
There is no evidence   
of an intrarenal mass   
on either side, and no   
evidence of  
hydronephrosis.  
The urinary bladder   
evaluation is limited   
by the lack of   
distention.  
Electronically   
authenticated by:   
SANG COULTER Date:   
2023 13:20    Normal                                  [facility]  
   
                                                    CBC AUTO DIFFon 2023   
   
                      BASO #     0.1 103/ul Normal     0.0-0.1    [facility]  
   
                                        Comment on above:   Performed By: #### U  
MICRO, ERUR ####  
[facility]  
[address] [address]  
Dr. Nikole Jacobs   
   
                                                    Basophils/100 WBC   
(Bld)           0.4 %           Normal          0.2-2.0         [facility]  
   
                                        Comment on above:   Performed By: #### U  
MICRO, ERUR ####  
[facility]  
[address] [address]  
Dr. Nikole Jacobs   
   
                      EO #       0.1 103/ul Normal     0.0-0.7    [facility]  
   
                                        Comment on above:   Performed By: #### U  
MICRO, ERUR ####  
[facility]  
[address] [address]  
Dr. Nikole Jacobs   
   
                                                    Eosinophils/100 WBC   
(Bld)           0.9 %           Normal          0.9-7.0         [facility]  
   
                                        Comment on above:   Performed By: #### U  
MICRO, ERUR ####  
[facility]  
[address] [address]  
Dr. Nikole Jacobs   
   
                                                    Erythrocyte   
distribution width   
(RBC) [Ratio]   13.6 %          Normal          11.0-15.0       [facility]  
   
                                        Comment on above:   Performed By: #### U  
MICRO, ERUR ####  
[facility]  
[address] [address]  
Dr. Nikole Jacobs   
   
                                                    Hematocrit (Bld)   
[Volume fraction] 33.4 %          Critically low  36.0-48.0       [facility]  
   
                                        Comment on above:   Performed By: #### U  
MICRO, ERUR ####  
[facility]  
[address] [place] [address]  
Dr. Nikole Jacobs   
   
                                                    Hemoglobin (Bld)   
[Mass/Vol]      10.9 g/dL       Critically low  12.0-16.0       [facility]  
   
                                        Comment on above:   Performed By: #### U  
MICRO, ERUR ####  
[facility]  
[address] [address]  
Dr. Nikole Jacobs   
   
                      IG #       0.21 10e3/ul Critically high 0.00-0.03  [facility]  
   
                                        Comment on above:   Performed By: #### U  
MICRO, ERUR ####  
[facility]  
[address] [place] [address]  
Dr. Nikole Jacobs   
   
                      IG %       1.5 %      Critically high 0.0-0.5    [facility]  
   
                                        Comment on above:   Performed By: #### U  
MICRO, ERUR ####  
[facility]  
[address] [address]  
Dr. Nikole Jacobs   
   
                      LYMPH #    2.8 103/ul Normal     1.2-3.8    [facility]  
   
                                        Comment on above:   Performed By: #### U  
MICRO, ERUR ####  
[facility]  
[address] [place] [address]  
Dr. Nikole Jacobs   
   
                                                    Lymphocytes/100 WBC   
(Bld)           19.9 %          Critically low  20.5-60.0       [facility]  
   
                                        Comment on above:   Performed By: #### U  
MICRO, ERUR ####  
[facility]  
[address] [place] [address]  
Dr. Nikole Jacobs   
   
                      MANUAL DIFF REQ NO         Normal                [facility]  
   
                                        Comment on above:   Performed By: #### U  
MICRO, ERUR ####  
[facility]  
[address] [place] [address]  
Dr. Nikole Jacobs   
   
                                                    MCH (RBC) [Entitic   
mass]           31.6 pg         Normal          26.7-34.0       [facility]  
   
                                        Comment on above:   Performed By: #### U  
MICRO, ERUR ####  
[facility]  
[address] [place] [address]  
Dr. Nikole Jacobs   
   
                                                    MCHC (RBC)   
[Mass/Vol]      32.6 g/dL       Normal          29.9-35.2       [facility]  
   
                                        Comment on above:   Performed By: #### U  
MICRO, ERUR ####  
[facility]  
[address] [place] [address]  
Dr. Nikole Jacobs   
   
                                                    MCV (RBC) [Entitic   
vol]            96.8 fL         Normal          81.0-99.0       [facility]  
   
                                        Comment on above:   Performed By: #### U  
MICRO, ERUR ####  
[facility]  
[address] [place] [address]  
Dr. Nikole Jacobs   
   
                      MONO #     1.3 103/ul Critically high 0.3-0.8    [facility]  
   
                                        Comment on above:   Performed By: #### U  
MICRO, ERUR ####  
[facility]  
[address] [place] [address]  
Dr. Nikole Jacobs   
   
                                                    Monocytes/100 WBC   
(Bld)           9.1 %           Normal          1.7-12.0        [facility]  
   
                                        Comment on above:   Performed By: #### U  
MICRO, ERUR ####  
[facility]  
[address] [place] [address]  
Dr. Nikole Jacobs   
   
                      NEUT #     9.5 103/ul Critically high 1.4-6.5    [facility]  
   
                                        Comment on above:   Performed By: #### U  
MICRO, ERUR ####  
[facility]  
[address] [place] [address]  
Dr. Nikole Jacobs   
   
                                                    Neutrophils/100 WBC   
(Bld)           68.2 %          Normal          43.0-75.0       [facility]  
   
                                        Comment on above:   Performed By: #### U  
MICRO, ERUR ####  
[facility]  
[address] [address]  
Dr. Nikole Jacobs   
   
                                                    Platelet mean   
volume (Bld)   
[Entitic vol]   9.4 fL          Critically low  9.5-13.5        [facility]  
   
                                        Comment on above:   Performed By: #### U  
MICRO, ERUR ####  
[facility]  
[address] [address]  
Dr. Nikole Jacobs   
   
                      PLT        476 103/ul Critically high 150-450    [facility]  
   
                                        Comment on above:   Performed By: #### U  
MICRO, ERUR ####  
[facility]  
[address] [place] [address]  
Dr. Nikole Jacobs   
   
                      RBC        3.45 106/ul Critically low 4.20-5.40  [facility]  
   
                                        Comment on above:   Performed By: #### U  
MICRO, ERUR ####  
[facility]  
[address] [place] [address]  
Dr. Nikole Jacobs   
   
                      WBC        14.0 103/ul Critically high 4.0-11.0   [facility]  
   
                                        Comment on above:   Performed By: #### U  
MICRO, ERUR ####  
[facility]  
[address] [place] [address]  
Dr. Nikole Jacobs   
   
                                                    PROF 14(COMP METB)on   
023   
   
                      Albumin [Mass/Vol] 3.6 g/dL   Normal     3.4-5.0    [facility]  
   
                                        Comment on above:   Performed By: #### C  
MP ####  
[facility]  
[address] [place] [address]  
Dr. Nikole Jacobs   
   
                                                    Albumin/Globulin   
[Mass ratio]    1.2 {ratio}     Normal                          [facility]  
   
                                        Comment on above:   Performed By: #### C  
MP ####  
[facility]  
[address] [place] [address]  
Dr. Nikole Jacobs   
   
                                                    ALP [Catalytic   
activity/Vol]   83 U/L          Normal                    [facility]  
   
                                        Comment on above:   Performed By: #### C  
MP ####  
[facility]  
[address] [address]  
Dr. Nikole Jacobs   
   
                                                    ALT [Catalytic   
activity/Vol]   24 U/L          Normal          14-59           [facility]  
   
                                        Comment on above:   Performed By: #### C  
MP ####  
[facility]  
[address] [place] [address]  
Dr. Nikole Jacobs   
   
                                                    Anion gap   
[Moles/Vol]     14.4 mmol/L     Normal                          [facility]  
   
                                        Comment on above:   Performed By: #### C  
MP ####  
[facility]  
[address] [address]  
Dr. Nikole Jacobs   
   
                                                    AST [Catalytic   
activity/Vol]   15 U/L          Normal          15-37           [facility]  
   
                                        Comment on above:   Performed By: #### C  
MP ####  
[facility]  
[address] [place] [address]  
Dr. Nikole Jacobs   
   
                                                    Bilirubin   
[Mass/Vol]      0.3 mg/dL       Normal          0.2-1.0         [facility]  
   
                                        Comment on above:   Performed By: #### C  
MP ####  
[facility]  
[address] [place] [address]  
Dr. Nikole Jacobs   
   
                      Calcium [Mass/Vol] 9.3 mg/dL  Normal     8.5-10.1   [facility]  
   
                                        Comment on above:   Performed By: #### C  
MP ####  
[facility]  
[address] [place] [address]  
Dr. Nikole Jacobs   
   
                                                    Chloride   
[Moles/Vol]     100 mmol/L      Normal                    [facility]  
   
                                        Comment on above:   Performed By: #### C  
MP ####  
[facility]  
[address] [place] [address]  
Dr. Nikole Jacobs   
   
                      CO2 [Moles/Vol] 26.8 mmol/L Normal     21.0-32.0  [facility]  
   
                                        Comment on above:   Performed By: #### C  
MP ####  
[facility]  
[address] [place] [address]  
Dr. Nikloe Jacobs   
   
                                                    Creatinine   
[Mass/Vol]      2.20 mg/dL      Critically high 0.55-1.02       [facility]  
   
                                        Comment on above:   Performed By: #### C  
MP ####  
[facility]  
[address] [place] [address]  
Dr. Nikole Jacobs   
   
                      EGFR-AF AMERICAN 27 mL/min/1.73m2 Critically low >=60       
  [facility]  
   
                                        Comment on above:   Performed By: #### C  
MP ####  
[facility]  
[address] [address]  
Dr. Nikole Jacobs   
   
                                                    EGFR-NON AF   
AMERICAN        22 mL/min/1.73m2 Critically low  >=60            [facility]  
   
                                        Comment on above:   Performed By: #### C  
MP ####  
[facility]  
[address] [address]  
Dr. Nikole Jacobs   
   
                                                    Globulin (S)   
[Mass/Vol]      3.1 g/dL        Normal                          [facility]  
   
                                        Comment on above:   Performed By: #### C  
MP ####  
[facility]  
[address] [address]  
Dr. Nikole Jacobs   
   
                      Glucose [Mass/Vol] 126 mg/dL  Critically high      T  
[facility]  
   
                                        Comment on above:   Performed By: #### C  
MP ####  
[facility]  
[address] [address]  
Dr. Nikole Jacobs   
   
                                                    Potassium   
[Moles/Vol]     4.2 mmol/L      Normal          3.5-5.1         [facility]  
   
                                        Comment on above:   Performed By: #### C  
MP ####  
[facility]  
[address] [address]  
Dr. Nikole Jacobs   
   
                      Protein [Mass/Vol] 6.7 g/dL   Normal     6.4-8.2    [facility]  
   
                                        Comment on above:   Performed By: #### C  
MP ####  
[facility]  
[address] [address]  
Dr. Nikole Jacobs   
   
                      Sodium [Moles/Vol] 137 mmol/L Normal     136-145    [facility]  
   
                                        Comment on above:   Performed By: #### C  
MP ####  
[facility]  
[address] [address]  
Dr. Nikole Jacobs   
   
                                                    Urea nitrogen   
[Mass/Vol]      39.0 mg/dL      Critically high 7.0-18.0        [facility]  
   
                                        Comment on above:   Performed By: #### C  
MP ####  
[facility]  
[address] [address]  
Dr. Nikole Jacobs   
   
                                                    Urea   
nitrogen/Creatinine   
[Mass ratio]    17.7 mg/mg      Normal                          [facility]  
   
                                        Comment on above:   Performed By: #### C  
MP ####  
[facility]  
[address], [place] [address]  
Dr. Nikole Jacobs   
   
                                                    MRI LSPINE WO CONon 20   
   
                                        MRI LSPINE WO CON   EXAMINATION: [place]   
[place]  
HISTORY: Spinal   
stenosis  
COMPARISON: No   
relevant comparison   
available.  
TECHNIQUE: A variety   
of imaging planes and   
parameters were   
utilized for  
visualization of   
suspected pathology.  
FINDINGS:  
For the purposes of   
numbering, sagittal T2   
image # 8 extends from   
the T10  
vertebral body   
superiorly to the S3   
level inferiorly.  
PARASPINAL AREA:   
Normal with no visible   
mass.  
BONES: 4 mm   
anterolisthesis of L4   
in relation L5.   
Moderate diffuse   
degenerative  
spondylosis. Areas of   
signal abnormality in   
the T12 and L1   
vertebral body likely  
representing   
hemangiomas.  
CORD/CAUDA EQUINA:   
Normal caliber,   
contour, and signal   
intensity.  
DISC LEVELS:  
12-L1: Moderate disc   
space narrowing and   
disc desiccation. Mild   
posterior disc  
protrusion. No central   
canal or foraminal   
stenosis  
L1-L2: Moderate   
degenerative disc   
disease is present   
without visible neural  
impingement.  
L2-L3: Early   
degenerative disc   
disease is present   
without focal   
protrusion or  
neural impingement.  
L3-L4: Early   
degenerative disc   
disease is present   
without focal   
protrusion or  
neural impingement.  
L4-L5: 4 mm   
anterolisthesis of L4   
on L5. Moderate   
diffuse   
bulge/pseudobulge with  
ligamentum flavum   
hypertrophy and facet   
osteoarthropathy. No   
central canal  
stenosis. Mild right   
and no left foraminal   
stenosis  
L5-S1: Disc   
desiccation. Extension   
of the disc into the   
inferior endplate of   
L5,  
Schmorl's node. Mild   
broad-based posterior   
disc herniation the   
protrusion type  
extending up to 3.2   
mm. No central or   
right foraminal   
stenosis. Mild   
narrowing  
of the left neural   
foramen.  
IMPRESSION:  
Degenerative changes   
resulting in mild   
right L4-L5 and left   
L5-S1 neural  
foraminal stenosis  
Electronically   
authenticated by:   
MARV HERNANDEZ Date:   
2023 16:39    Normal                                  [facility]  
   
                                                    CBC AUTO DIFFon 2022   
   
                      BASO #     0.1 103/ul Normal     0.0-0.1    [facility]  
   
                                        Comment on above:   Performed By: #### C  
BC ####  
[facility]  
[address], [place] [address]  
Dr. Nikole Jacobs   
   
                                                    Basophils/100 WBC   
(Bld)           0.5 %           Normal          0.2-2.0         [facility]  
   
                                        Comment on above:   Performed By: #### C  
BC ####  
[facility]  
[address] [place] [address]  
Dr. Nikole Jacobs   
   
                      EO #       0.1 103/ul Normal     0.0-0.7    [facility]  
   
                                        Comment on above:   Performed By: #### C  
BC ####  
[facility]  
[address] [place] [address]  
Dr. Nikole Jacobs   
   
                                                    Eosinophils/100 WBC   
(Bld)           1.1 %           Normal          0.9-7.0         [facility]  
   
                                        Comment on above:   Performed By: #### C  
BC ####  
[facility]  
[address] [place] [address]  
Dr. Nikole Jacobs   
   
                                                    Erythrocyte   
distribution width   
(RBC) [Ratio]   14.2 %          Normal          11.0-15.0       [facility]  
   
                                        Comment on above:   Performed By: #### C  
BC ####  
[facility]  
[address] [place] [address]  
Dr. Nikole Jacobs   
   
                                                    Hematocrit (Bld)   
[Volume fraction] 32.7 %          Critically low  36.0-48.0       [facility]  
   
                                        Comment on above:   Performed By: #### C  
BC ####  
[facility]  
[address] [place] [address]  
Dr. Nikole Jacobs   
   
                                                    Hemoglobin (Bld)   
[Mass/Vol]      10.9 g/dL       Critically low  12.0-16.0       [facility]  
   
                                        Comment on above:   Performed By: #### C  
BC ####  
[facility]  
[address] [place] [address]  
Dr. Nikole Jacobs   
   
                      IG #       0.22 10e3/ul Critically high 0.00-0.03  [facility]  
   
                                        Comment on above:   Performed By: #### C  
BC ####  
[facility]  
[address] [place] [address]  
Dr. Nikole Jacobs   
   
                      IG %       2.2 %      Critically high 0.0-0.5    [facility]  
   
                                        Comment on above:   Performed By: #### C  
BC ####  
[facility]  
[address] [place] [address]  
Dr. Nikole Jacobs   
   
                      LYMPH #    2.1 103/ul Normal     1.2-3.8    [facility]  
   
                                        Comment on above:   Performed By: #### C  
BC ####  
[facility]  
[address] [place] [address]  
Dr. Nikole Jacobs   
   
                                                    Lymphocytes/100 WBC   
(Bld)           20.9 %          Normal          20.5-60.0       [facility]  
   
                                        Comment on above:   Performed By: #### C  
BC ####  
[facility]  
[address] [place] [address]  
Dr. Nikole Jacobs   
   
                      MANUAL DIFF REQ NO         Normal                [facility]  
   
                                        Comment on above:   Performed By: #### C  
BC ####  
[facility]  
[address] [place] [address]  
Dr. Nikole Jacobs   
   
                                                    MCH (RBC) [Entitic   
mass]           31.9 pg         Normal          26.7-34.0       [facility]  
   
                                        Comment on above:   Performed By: #### C  
BC ####  
[facility]  
[address] [place] [address]  
Dr. Nikole Jacobs   
   
                                                    MCHC (RBC)   
[Mass/Vol]      33.3 g/dL       Normal          29.9-35.2       [facility]  
   
                                        Comment on above:   Performed By: #### C  
BC ####  
[facility]  
[address] [place] [address]  
Dr. Nikole Jacobs   
   
                                                    MCV (RBC) [Entitic   
vol]            95.6 fL         Normal          81.0-99.0       [facility]  
   
                                        Comment on above:   Performed By: #### C  
BC ####  
[facility]  
[address] [place] [address]  
Dr. Nikole Jacobs   
   
                      MONO #     1.0 103/ul Critically high 0.3-0.8    [facility]  
   
                                        Comment on above:   Performed By: #### C  
BC ####  
[facility]  
[address] [place] [address]  
Dr. Nikole Jacobs   
   
                                                    Monocytes/100 WBC   
(Bld)           10.1 %          Normal          1.7-12.0        [facility]  
   
                                        Comment on above:   Performed By: #### C  
BC ####  
[facility]  
[address] [place] [address]  
Dr. Nikole Jacobs   
   
                      NEUT #     6.4 103/ul Normal     1.4-6.5    [facility]  
   
                                        Comment on above:   Performed By: #### C  
BC ####  
[facility]  
[address] [place] [address]  
Dr. Nikole Jacobs   
   
                                                    Neutrophils/100 WBC   
(Bld)           65.2 %          Normal          43.0-75.0       [facility]  
   
                                        Comment on above:   Performed By: #### C  
BC ####  
[facility]  
[address] [address]  
Dr. Nikole Jacobs   
   
                                                    Platelet mean   
volume (Bld)   
[Entitic vol]   8.9 fL          Critically low  9.5-13.5        [facility]  
   
                                        Comment on above:   Performed By: #### C  
BC ####  
[facility]  
[address] [place] [address]  
Dr. Nikole Jacobs   
   
                      PLT        409 103/ul Normal     150-450    [facility]  
   
                                        Comment on above:   Performed By: #### C  
BC ####  
[facility]  
[address] [place] [address]  
Dr. Nikole Jacobs   
   
                      RBC        3.42 106/ul Critically low 4.20-5.40  [facility]  
   
                                        Comment on above:   Performed By: #### C  
BC ####  
[facility]  
[address] [place] [address]  
Dr. Nikole Jacobs   
   
                      WBC        9.8 103/ul Normal     4.0-11.0   [facility]  
   
                                        Comment on above:   Performed By: #### C  
BC ####  
[facility]  
[address] [place] [address]  
Dr. Nikole Jacobs   
   
                                                    PROF 14(COMP METB)on   
022   
   
                      Albumin [Mass/Vol] 3.3 g/dL   Critically low 3.4-5.0      
[facility]  
   
                                        Comment on above:   Performed By: #### C  
MP ####  
[facility]  
[address] [place] [address]  
Dr. Nikole Jacobs   
   
                                                    Albumin/Globulin   
[Mass ratio]    1.0 {ratio}     Normal                          [facility]  
   
                                        Comment on above:   Performed By: #### C  
MP ####  
[facility]  
[address] [place] [address]  
Dr. Nikole Jacobs   
   
                                                    ALP [Catalytic   
activity/Vol]   79 U/L          Normal                    [facility]  
   
                                        Comment on above:   Performed By: #### C  
MP ####  
[facility]  
[address] [address]  
Dr. Nikole Jacosb   
   
                                                    ALT [Catalytic   
activity/Vol]   31 U/L          Normal          14-59           [facility]  
   
                                        Comment on above:   Performed By: #### C  
MP ####  
[facility]  
[address] [address]  
Dr. Nikole Jacosb   
   
                                                    Anion gap   
[Moles/Vol]     13.3 mmol/L     Normal                          [facility]  
   
                                        Comment on above:   Performed By: #### C  
MP ####  
[facility]  
[address] [address]  
Dr. Nikole Jacobs   
   
                                                    AST [Catalytic   
activity/Vol]   20 U/L          Normal          15-37           [facility]  
   
                                        Comment on above:   Performed By: #### C  
MP ####  
[facility]  
[address] [place] [address]  
Dr. Nikole Jacobs   
   
                                                    Bilirubin   
[Mass/Vol]      0.5 mg/dL       Normal          0.2-1.0         [facility]  
   
                                        Comment on above:   Performed By: #### C  
MP ####  
[facility]  
[address] [address]  
Dr. Nikole Jacobs   
   
                      Calcium [Mass/Vol] 9.2 mg/dL  Normal     8.5-10.1   [facility]  
   
                                        Comment on above:   Performed By: #### C  
MP ####  
[facility]  
[address] [address]  
Dr. Nikole Jacobs   
   
                                                    Chloride   
[Moles/Vol]     101 mmol/L      Normal                    [facility]  
   
                                        Comment on above:   Performed By: #### C  
MP ####  
[facility]  
[address] [address]  
Dr. Nikole Jacobs   
   
                      CO2 [Moles/Vol] 27.9 mmol/L Normal     21.0-32.0  [facility]  
   
                                        Comment on above:   Performed By: #### C  
MP ####  
[facility]  
[address] [address]  
Dr. Nikole Jacobs   
   
                                                    Creatinine   
[Mass/Vol]      1.36 mg/dL      Critically high 0.55-1.02       [facility]  
   
                                        Comment on above:   Performed By: #### C  
MP ####  
[facility]  
[address] [address]  
Dr. Nikole Jacobs   
   
                      EGFR-AF AMERICAN 47 mL/min/1.73m2 Critically low >=60       
  [facility]  
   
                                        Comment on above:   Performed By: #### C  
MP ####  
[facility]  
[address] [address]  
Dr. Nikole Jacobs   
   
                                                    EGFR-NON AF   
AMERICAN        39 mL/min/1.73m2 Critically low  >=60            [facility]  
   
                                        Comment on above:   Performed By: #### C  
MP ####  
[facility]  
[address] [address]  
Dr. Nikole Jacobs   
   
                                                    Globulin (S)   
[Mass/Vol]      3.4 g/dL        Normal                          [facility]  
   
                                        Comment on above:   Performed By: #### C  
MP ####  
[facility]  
[address] [address]  
Dr. Nikole Jacobs   
   
                      Glucose [Mass/Vol] 120 mg/dL  Critically high      [facility]  
   
                                        Comment on above:   Performed By: #### C  
MP ####  
[facility]  
[address] [address]  
Dr. Nikole Jacobs   
   
                                                    Potassium   
[Moles/Vol]     5.2 mmol/L      Critically high 3.5-5.1         [facility]  
   
                                        Comment on above:   Performed By: #### C  
MP ####  
[facility]  
[address] [address]  
Dr. Nikole Jacobs   
   
                      Protein [Mass/Vol] 6.7 g/dL   Normal     6.4-8.2    [facility]  
   
                                        Comment on above:   Performed By: #### C  
MP ####  
[facility]  
[address] [address]  
Dr. Nikole Jacobs   
   
                      Sodium [Moles/Vol] 137 mmol/L Normal     136-145    [facility]  
   
                                        Comment on above:   Performed By: #### C  
MP ####  
[facility]  
[address] [address]  
Dr. Nikole Jacobs   
   
                                                    Urea nitrogen   
[Mass/Vol]      32.0 mg/dL      Critically high 7.0-18.0        [facility]  
   
                                        Comment on above:   Performed By: #### C  
MP ####  
[facility]  
[address] [place] [address]  
Dr. Nikole Jacobs   
   
                                                    Urea   
nitrogen/Creatinine   
[Mass ratio]    23.5 mg/mg      Normal                          [facility]  
   
                                        Comment on above:   Performed By: #### C  
MP ####  
[facility]  
[address] [place] [address]  
Dr. Nikole Jacobs   
   
                                                    BNPon 12-   
   
                                                    Natriuretic peptide   
B (Bld) [Mass/Vol] 455.0 pg/mL     Normal          <=900.0         [facility]  
   
                                        Comment on above:   Performed By: #### C  
MP ####  
[facility]  
[address] [place] [address]  
Dr. Nikole Jacobs   
   
                                                    CARDIAC SUYAPA ADMITon 12-15-2  
022   
   
                                                    CK [Catalytic   
activity/Vol]   61 U/L          Normal                    [facility]  
   
                                        Comment on above:   Performed By: #### C  
MP ####  
[facility]  
[address] [place] [address]  
Dr. Nikole Jacobs   
   
                      CK.MB [Mass/Vol] 3.26 ng/mL Normal     <=3.60     [facility]  
   
                                        Comment on above:   Performed By: #### C  
MP ####  
[facility]  
[address] [place] [address]  
Dr. Nikole Jacobs   
   
                      HSTROP     27.9 pg/mL Normal     4.0-51.3   [facility]  
   
                                        Comment on above:   Result Comment: CUT-  
OFF POINTS HAVE BEEN ESTABLISHED BASED ON   
THE   
FOURTH UNIVERSAL DEFINITIONS OF MYOCARDIAL  
INFARCTION. THE UPPER REFERENCE LIMIT (URL) OF TROPONIN, DEFINED   
AS THE 99TH PERCENTILE OF  
cTnI DISTRIBUTION IN A REFERENCE POPULATION, HAS BEEN CONFIRMED AS   
THE DECISION THRESHOLD  
FOR MI DIAGNOSIS.   
   
                                                            Performed By: #### C  
MP ####  
[facility]  
[address] [place] [address]  
Dr. Nikole Jacobs   
   
                      YOLI        113 ng/mL  Critically high 9-82       [facility]  
   
                                        Comment on above:   Performed By: #### C  
MP ####  
[facility]  
[address] [place] [address]  
Dr. Nikole Jacobs   
   
                                                    CBC AUTO DIFFon 12-   
   
                      BASO #     0.1 103/ul Normal     0.0-0.1    [facility]  
   
                                        Comment on above:   Performed By: #### C  
BC ####  
[facility]  
[address] [address]  
Dr. Nikole Jacobs   
   
                                                    Basophils/100 WBC   
(Bld)           0.4 %           Normal          0.2-2.0         [facility]  
   
                                        Comment on above:   Performed By: #### C  
BC ####  
[facility]  
[address] [address]  
Dr. Nikole Jacobs   
   
                      EO #       0.1 103/ul Normal     0.0-0.7    [facility]  
   
                                        Comment on above:   Performed By: #### C  
BC ####  
[facility]  
[address] [address]  
Dr. Nikole Jacobs   
   
                                                    Eosinophils/100 WBC   
(Bld)           0.5 %           Critically low  0.9-7.0         [facility]  
   
                                        Comment on above:   Performed By: #### C  
BC ####  
[facility]  
[address] [address]  
Dr. Nikole Jacobs   
   
                                                    Erythrocyte   
distribution width   
(RBC) [Ratio]   13.6 %          Normal          11.0-15.0       [facility]  
   
                                        Comment on above:   Performed By: #### C  
BC ####  
[facility]  
[address] [address]  
Dr. Nikole Jacobs   
   
                                                    Hematocrit (Bld)   
[Volume fraction] 34.5 %          Critically low  36.0-48.0       [facility]  
   
                                        Comment on above:   Performed By: #### C  
BC ####  
[facility]  
[address] [address]  
Dr. Nikole Jacobs   
   
                                                    Hemoglobin (Bld)   
[Mass/Vol]      11.5 g/dL       Critically low  12.0-16.0       [facility]  
   
                                        Comment on above:   Performed By: #### C  
BC ####  
[facility]  
[address] [address]  
Dr. Nikole Jacobs   
   
                      IG #       0.48 10e3/ul Critically high 0.00-0.03  [facility]  
   
                                        Comment on above:   Performed By: #### C  
BC ####  
[facility]  
[address] [address]  
Dr. Nikole Jacobs   
   
                      IG %       3.2 %      Critically high 0.0-0.5    [facility]  
   
                                        Comment on above:   Performed By: #### C  
BC ####  
[facility]  
[address] [address]  
Dr. Nikole Jacobs   
   
                      LYMPH #    1.8 103/ul Normal     1.2-3.8    [facility]  
   
                                        Comment on above:   Performed By: #### C  
BC ####  
[facility]  
[address] [place] [address]  
Dr. Nikole Jacobs   
   
                                                    Lymphocytes/100 WBC   
(Bld)           12.1 %          Critically low  20.5-60.0       [facility]  
   
                                        Comment on above:   Performed By: #### C  
BC ####  
[facility]  
[address] [place] [address]  
Dr. Nikole Jacobs   
   
                      MANUAL DIFF REQ NO         Normal                [facility]  
   
                                        Comment on above:   Performed By: #### C  
BC ####  
[facility]  
[address] [place] [address]  
Dr. Nikole Jacobs   
   
                                                    MCH (RBC) [Entitic   
mass]           31.9 pg         Normal          26.7-34.0       [facility]  
   
                                        Comment on above:   Performed By: #### C  
BC ####  
[facility]  
[address] [place] [address]  
Dr. Nikole Jacobs   
   
                                                    MCHC (RBC)   
[Mass/Vol]      33.3 g/dL       Normal          29.9-35.2       [facility]  
   
                                        Comment on above:   Performed By: #### C  
BC ####  
[facility]  
[address] [place] [address]  
Dr. Nikole Jacobs   
   
                                                    MCV (RBC) [Entitic   
vol]            95.6 fL         Normal          81.0-99.0       [facility]  
   
                                        Comment on above:   Performed By: #### C  
BC ####  
[facility]  
[address] [place] [address]  
Dr. Nikole Jacobs   
   
                      MONO #     1.4 103/ul Critically high 0.3-0.8    [facility]  
   
                                        Comment on above:   Performed By: #### C  
BC ####  
[facility]  
[address] [place] [address]  
Dr. Nikole Jacobs   
   
                                                    Monocytes/100 WBC   
(Bld)           9.6 %           Normal          1.7-12.0        [facility]  
   
                                        Comment on above:   Performed By: #### C  
BC ####  
[facility]  
[address] [address]  
Dr. Nikole Jacobs   
   
                      NEUT #     11.1 103/ul Critically high 1.4-6.5    [facility]  
   
                                        Comment on above:   Performed By: #### C  
BC ####  
[facility]  
[address] [address]  
Dr. Nikole Jacobs   
   
                                                    Neutrophils/100 WBC   
(Bld)           74.2 %          Normal          43.0-75.0       [facility]  
   
                                        Comment on above:   Performed By: #### C  
BC ####  
[facility]  
[address] [address]  
Dr. Nikole Jacobs   
   
                                                    Platelet mean   
volume (Bld)   
[Entitic vol]   9.4 fL          Critically low  9.5-13.5        [facility]  
   
                                        Comment on above:   Performed By: #### C  
BC ####  
[facility]  
[address] [place] [address]  
Dr. Nkiole Jacobs   
   
                      PLT        415 103/ul Normal     150-450    [facility]  
   
                                        Comment on above:   Performed By: #### C  
BC ####  
[facility]  
[address] [place] [address]  
Dr. Nikole Jacobs   
   
                      RBC        3.61 106/ul Critically low 4.20-5.40  [facility]  
   
                                        Comment on above:   Performed By: #### C  
BC ####  
[facility]  
[address] [place] [address]  
Dr. Nikole Jacobs   
   
                      WBC        15.0 103/ul Critically high 4.0-11.0   [facility]  
   
                                        Comment on above:   Performed By: #### C  
BC ####  
[facility]  
[address] [place] [address]  
Dr. Nikole Jacobs   
   
                                                    FREE THYROXINE INDEX T7on 12  
-   
   
                      FTI        2.70       Normal     1.30-4.50  [facility]  
   
                                        Comment on above:   Performed By: #### C  
MP ####  
[facility]  
[address] [place] [address]  
Dr. Nikole Jacobs   
   
                      T3U        36.0 %     Normal     30.0-39.0  [facility]  
   
                                        Comment on above:   Performed By: #### C  
MP ####  
[facility]  
[address] [address]  
Dr. Nikole Jacobs   
   
                      T4 [Mass/Vol] 7.50 ug/dL Normal     4.80-13.90 [facility]  
   
                                        Comment on above:   Performed By: #### C  
MP ####  
[facility]  
[address] [address]  
Dr. Nikole Jacobs   
   
                                                    IRONon 12-   
   
                      Iron [Mass/Vol] 104.0 ug/dL Normal     50.0-170.0 [facility]  
   
                                        Comment on above:   Performed By: #### C  
MP ####  
[facility]  
[address] [address]  
Dr. Nikole Jacobs   
   
                                                    PROF 14(COMP METB)on 12-15-2  
022   
   
                      Albumin [Mass/Vol] 3.3 g/dL   Critically low 3.4-5.0    [facility]  
   
                                        Comment on above:   Performed By: #### C  
MP ####  
[facility]  
[address] [place] [address]  
Dr. Nikole Jacobs   
   
                                                    Albumin/Globulin   
[Mass ratio]    1.0 {ratio}     Normal                          [facility]  
   
                                        Comment on above:   Performed By: #### C  
MP ####  
[facility]  
[address] [address]  
Dr. Nikole Jacobs   
   
                                                    ALP [Catalytic   
activity/Vol]   69 U/L          Normal                    [facility]  
   
                                        Comment on above:   Performed By: #### C  
MP ####  
[facility]  
[address] [place] [address]  
Dr. Nikole Jacobs   
   
                                                    ALT [Catalytic   
activity/Vol]   36 U/L          Normal          14-59           [facility]  
   
                                        Comment on above:   Performed By: #### C  
MP ####  
[facility]  
[address] [place] [address]  
Dr. Nikole Jacobs   
   
                                                    Anion gap   
[Moles/Vol]     13.6 mmol/L     Normal                          [facility]  
   
                                        Comment on above:   Performed By: #### C  
MP ####  
[facility]  
[address] [place] [address]  
Dr. Nikole Jacobs   
   
                                                    AST [Catalytic   
activity/Vol]   16 U/L          Normal          15-37           [facility]  
   
                                        Comment on above:   Performed By: #### C  
MP ####  
[facility]  
[address] [address]  
Dr. Nikole Jacobs   
   
                                                    Bilirubin   
[Mass/Vol]      0.6 mg/dL       Normal          0.2-1.0         [facility]  
   
                                        Comment on above:   Performed By: #### C  
MP ####  
[facility]  
[address] [address]  
Dr. Nikole Jacobs   
   
                      Calcium [Mass/Vol] 9.1 mg/dL  Normal     8.5-10.1   [facility]  
   
                                        Comment on above:   Performed By: #### C  
MP ####  
[facility]  
[address] [address]  
Dr. Nikole Jacobs   
   
                                                    Chloride   
[Moles/Vol]     103 mmol/L      Normal                    [facility]  
   
                                        Comment on above:   Performed By: #### C  
MP ####  
[facility]  
[address] [address]  
Dr. Nikole Jacobs   
   
                      CO2 [Moles/Vol] 27.4 mmol/L Normal     21.0-32.0  [facility]  
   
                                        Comment on above:   Performed By: #### C  
MP ####  
[facility]  
[address] [address]  
Dr. Nikole Jacobs   
   
                                                    Creatinine   
[Mass/Vol]      1.90 mg/dL      Critically high 0.55-1.02       [facility]  
   
                                        Comment on above:   Performed By: #### C  
MP ####  
[facility]  
[address] [address]  
Dr. Nikole Jacobs   
   
                      EGFR-AF AMERICAN 32 mL/min/1.73m2 Critically low >=60       
  [facility]  
   
                                        Comment on above:   Performed By: #### C  
MP ####  
[facility]  
[address] [address]  
Dr. Nikole Jacobs   
   
                                                    EGFR-NON AF   
AMERICAN        27 mL/min/1.73m2 Critically low  >=60            [facility]  
   
                                        Comment on above:   Performed By: #### C  
MP ####  
[facility]  
[address] [address]  
Dr. Nikole Jacobs   
   
                                                    Globulin (S)   
[Mass/Vol]      3.4 g/dL        Normal                          [facility]  
   
                                        Comment on above:   Performed By: #### C  
MP ####  
[facility] [facility]  
[address] [address]  
Dr. Nikole Jacobs   
   
                      Glucose [Mass/Vol] 122 mg/dL  Critically high      T  
[facility]  
   
                                        Comment on above:   Performed By: #### C  
MP ####  
[facility]  
[address] [address]  
Dr. Nikole Jacobs   
   
                                                    Potassium   
[Moles/Vol]     5.0 mmol/L      Normal          3.5-5.1         [facility]  
   
                                        Comment on above:   Performed By: #### C  
MP ####  
[facility]  
[address] [address]  
Dr. Nikole Jacobs   
   
                      Protein [Mass/Vol] 6.7 g/dL   Normal     6.4-8.2    [facility]  
   
                                        Comment on above:   Performed By: #### C  
MP ####  
[facility]  
[address] [address]  
Dr. Nikole Jacobs   
   
                      Sodium [Moles/Vol] 139 mmol/L Normal     136-145    [facility]  
   
                                        Comment on above:   Performed By: #### C  
MP ####  
[facility]  
[address] [address]  
Dr. Nikole Jacobs   
   
                                                    Urea nitrogen   
[Mass/Vol]      55.0 mg/dL      Critically high 7.0-18.0        [facility]  
   
                                        Comment on above:   Performed By: #### C  
MP ####  
[facility]  
[address] [address]  
Dr. Nikole Jacobs   
   
                                                    Urea   
nitrogen/Creatinine   
[Mass ratio]    28.9 mg/mg      Normal                          [facility]  
   
                                        Comment on above:   Performed By: #### C  
MP ####  
[facility]  
[address] [address]  
Dr. Nikole Jacobs   
   
                                                    TSHon 12-   
   
                      TSH        1.585 uIU/mL Normal     0.358-3.740 [facility]  
   
                                        Comment on above:   Performed By: #### C  
MP ####  
[facility]  
[address] [address]  
Dr. Nikole Jacobs   
   
                                                    SARS-CoV-2on 2022   
   
                                                    SARS-CoV-2   
(COVID-19) RNA   
ROMI+probe Ql (Unsp   
spec)                           Normal                          [place]  
   
                                        Comment on above:   Performed By: #### C  
OVID ####  
[address] [place], [place] [address] [phone]  
: Silvio Nicole MD  
[facility]  
[address] [place] [address] [phone]  
: Marv Obregon MD   
   
                                                    SARS-CoV-2   
(COVID-19) RNA   
ROMI+probe Ql (Unsp   
spec)           Not detected    Normal          NOTDET          [place]  
   
                                        Comment on above:   Result Comment:  
The specimen is NEGATIVE for SARS-CoV-2, the novel coronavirus   
associated with  
COVID-19.  
A negative result does not rule out COVID-19.  
Christine SARS-CoV-2 for use on the [employer] is a   
real-time RT-PCR  
test intended for the qualitative detection of nucleic acids from   
SARS-CoV-2  
in clinician-collected nasal, nasopharyngeal, and oropharyngeal   
swab specimens  
from individuals who meet COVID-19 clinical and/or epidemiological   
criteria.  
Christine SARS-CoV-2 is for use only under Emergency Use Authorization   
(EUA) in  
laboratories certified under Clinical Laboratory Improvement   
Amendments of 1988  
(CLIA), 42 U.S.C. ?263a, that meet requirements to perform high or   
moderate  
complexity tests.  
An individual without symptoms of COVID-19 and who is not shedding   
SARS-CoV-2  
virus would expect to have a negative (not detected) result in   
this assay.  
Fact sheet for Healthcare Providers:   
https://www.fda.gov/media/668448/download  
Fact sheet for Patients: https://www.fda.gov/media/185114/download  
METHODOLOGY: RT-PCR   
   
                                                            Performed By: #### C  
OVID ####  
[facility]  
[address] [place], [place] [address] [phone]  
: Silvio Nicole MD  
[facility]  
[address] [place] [address] [place] [address] [phone]  
: Marv Obregon MD   
   
                                                    SARS-CoV-2on 2022   
   
                                                    SARS-CoV-2   
(COVID-19) RNA   
ROMI+probe Ql (Unsp   
spec)           .NASOPHARYNGEAL SWAB Normal                          [facility]  
   
                                        Comment on above:   Performed By: #### C  
OVID ####  
[address] [place], [place] [id number] [phone]  
: Silvio Nicole MD  
[facility]  
[address] [place] [address] [phone]  
: MD BLU Guerra-CoV-2on 2022   
   
                                                    SARS-CoV-2   
(COVID-19) RNA   
ROMI+probe Ql (Unsp   
spec)                           Normal                          [place]  
   
                                        Comment on above:   Performed By: #### C  
OVID ####  
[address] [address]  
[phone]  
: Silvio Nicole MD  
[facility]  
[address] [place] [address] [phone]  
: Marv Obregon MD   
   
                                                    SARS-CoV-2   
(COVID-19) RNA   
ROMI+probe Ql (Unsp   
spec)           Not detected    Normal          [facility]          [place]  
   
                                        Comment on above:   Result Comment:  
The specimen is NEGATIVE for SARS-CoV-2, the novel coronavirus   
associated with  
COVID-19.  
A negative result does not rule out COVID-19.  
Christine SARS-CoV-2 for use on the [employer] is a   
real-time RT-PCR  
test intended for the qualitative detection of nucleic acids from   
SARS-CoV-2  
in clinician-collected nasal, nasopharyngeal, and oropharyngeal   
swab specimens  
from individuals who meet COVID-19 clinical and/or epidemiological   
criteria.  
Christine SARS-CoV-2 is for use only under Emergency Use Authorization   
(EUA) in  
laboratories certified under Clinical Laboratory Improvement   
Amendments of 1988  
(CLIA), 42 U.S.C. ?263a, that meet requirements to perform high or   
moderate  
complexity tests.  
An individual without symptoms of COVID-19 and who is not shedding   
SARS-CoV-2  
virus would expect to have a negative (not detected) result in   
this assay.  
Fact sheet for Healthcare Providers:   
https://www.fda.gov/media/349083/download  
Fact sheet for Patients: https://www.fda.gov/media/101338/download  
METHODOLOGY: RT-PCR   
   
                                                            Performed By: #### C  
OVID ####  
[address] [address]  
[phone]  
: Silvio Nicole MD  
[facility]  
[address] [place] [address] [phone]  
: Marv Obregon MD   
   
                                                    SARS-CoV-2on 2022   
   
                                                    SARS-CoV-2   
(COVID-19) RNA   
ROMI+probe Ql (Unsp   
spec)           .NASOPHARYNGEAL SWAB Normal                          [facility]  
   
                                        Comment on above:   Performed By: #### C  
OVID ####  
[address] [address]  
[phone]  
: Silvio Nicole MD  
[facility]  
[address] [place] [address] [phone]  
: Marv Obregon MD   
   
                                                    CBC with Diffon 2022   
   
                      Abs. Basophil 0.05 k/uL  Normal     0.00-0.20  [facility]  
   
                                        Comment on above:   Performed By: #### C  
DP, CP, LIPRF, TSH, FT3, VD25, T4, GLYHGB   
####  
[address] [address]  
[phone]  
: Silvio Nicole MD   
   
                      Abs.Imm.Granulocyte 0.08 k/uL  Normal     0.00-0.30  [facility]  
   
                                        Comment on above:   Performed By: #### C  
DP, CP, LIPRF, TSH, FT3, VD25, T4, GLYHGB   
####  
[address] [address]  
[phone]  
: Silvio Nicole MD   
   
                                                    Abs.Neutrophil   
(Seg)           11.43 k/uL      High            1.50-8.10       [facility]  
   
                                        Comment on above:   Performed By: #### C  
DP, CP, LIPRF, TSH, FT3, VD25, T4, GLYHGB   
####  
[address] [address]  
[phone]  
: Silvio Nicole MD   
   
                                                    Basophils/100 WBC   
(Bld)           0 %             Normal          0-2             [facility]  
   
                                        Comment on above:   Performed By: #### C  
DP, CP, LIPRF, TSH, FT3, VD25, T4, GLYHGB   
####  
[place] [address]  
[phone]  
: Silvio Nicole MD   
   
                                                    Eosinophils (Bld)   
[#/Vol]         0.05 10*3/uL    Normal          0.00-0.44       [facility]  
   
                                        Comment on above:   Performed By: #### C  
DP, CP, LIPRF, TSH, FT3, VD25, T4, GLYHGB   
####  
[place] [address]  
[phone]  
: Silvio Nicole MD   
   
                                                    Eosinophils/100 WBC   
(Bld)           0 %             Low             1-4             [facility]  
   
                                        Comment on above:   Performed By: #### C  
DP, CP, LIPRF, TSH, FT3, VD25, T4, GLYHGB   
####  
[place] [address]  
[phone]  
: Silvio Nicole MD   
   
                                                    Erythrocyte   
distribution width   
(RBC) [Ratio]   13.1 %          Normal          11.8-14.4       [facility]  
   
                                        Comment on above:   Performed By: #### C  
DP, CP, LIPRF, TSH, FT3, VD25, T4, GLYHGB   
####  
[place] [address]  
[phone]  
: Silvio Nicole MD   
   
                                                    Hematocrit (Bld)   
[Volume fraction] 39.4 %          Normal          36.3-47.1       [facility]  
   
                                        Comment on above:   Performed By: #### C  
DP, CP, LIPRF, TSH, FT3, VD25, T4, GLYHGB   
####  
[place] [address]  
[phone]  
: Silvio Nicole MD   
   
                                                    Hemoglobin (Bld)   
[Mass/Vol]      12.7 g/dL       Normal          11.9-15.1       [facility]  
   
                                        Comment on above:   Performed By: #### C  
DP, CP, LIPRF, TSH, FT3, VD25, T4, GLYHGB   
####  
[address] [address]  
[phone]  
: Silvio Nicole MD   
   
                                                    Immature   
granulocytes/100   
WBC (Bld)       1 %             High            0               [facility]  
   
                                        Comment on above:   Performed By: #### C  
DP, CP, LIPRF, TSH, FT3, VD25, T4, GLYHGB   
####  
[address] [address]  
[phone]  
: Silvio Nicole MD   
   
                                                    Lymphocytes (Bld)   
[#/Vol]         2.24 10*3/uL    Normal          1.10-3.70       [facility]  
   
                                        Comment on above:   Performed By: #### C  
DP, CP, LIPRF, TSH, FT3, VD25, T4, GLYHGB   
####  
[place] [address]  
[phone]  
: Silvio Nicole MD   
   
                                                    Lymphocytes/100 WBC   
(Bld)           15 %            Low             24-43           [facility]  
   
                                        Comment on above:   Performed By: #### C  
DP, CP, LIPRF, TSH, FT3, VD25, T4, GLYHGB   
####  
[facility]  
[address] [place] [address]  
[phone]  
: Silvio Nicole MD   
   
                                                    MCH (RBC) [Entitic   
mass]           31.4 pg         Normal          25.2-33.5       [facility]  
   
                                        Comment on above:   Performed By: #### C  
DP, CP, LIPRF, TSH, FT3, VD25, T4, GLYHGB   
####  
[address] [address]  
[phone]  
: Silvio Nicole MD   
   
                                                    MCHC (RBC)   
[Mass/Vol]      32.2 g/dL       Normal          28.4-34.8       [facility]  
   
                                        Comment on above:   Performed By: #### C  
DP, CP, LIPRF, TSH, FT3, VD25, T4, GLYHGB   
####  
[address] [address]  
[phone]  
: Silvio Nicole MD   
   
                                                    MCV (RBC) [Entitic   
vol]            97.3 fL         Normal          82.6-102.9      [facility]  
   
                                        Comment on above:   Performed By: #### C  
DP, CP, LIPRF, TSH, FT3, VD25, T4, GLYHGB   
####  
[address] [address]  
[phone]  
: Silvio Nicole MD   
   
                                                    Monocytes (Bld)   
[#/Vol]         1.32 10*3/uL    High            0.10-1.20       [facility]  
   
                                        Comment on above:   Performed By: #### C  
DP, CP, LIPRF, TSH, FT3, VD25, T4, GLYHGB   
####  
[address] [address]  
[phone]  
: Silvio Nicole MD   
   
                                                    Monocytes/100 WBC   
(Bld)           9 %             Normal          3-12            [facility]  
   
                                        Comment on above:   Performed By: #### C  
DP, CP, LIPRF, TSH, FT3, VD25, T4, GLYHGB   
####  
[address] [address]  
[phone]  
: Silvio Nicole MD   
   
                      Neutrophil (Seg) 75 %       High       36-65      [facility]  
   
                                        Comment on above:   Performed By: #### C  
DP, CP, LIPRF, TSH, FT3, VD25, T4, GLYHGB   
####  
[address] [address]  
[phone]  
: Silvio Nicole MD   
   
                      NRBC Automated 0.0 per 100 WBC Normal     0.0        [facility]  
   
                                        Comment on above:   Performed By: #### C  
DP, CP, LIPRF, TSH, FT3, VD25, T4, GLYHGB   
####  
[address] [address]  
[phone]  
: Silvio Nicole MD   
   
                                                    Platelet mean   
volume (Bld)   
[Entitic vol]   10.1 fL         Normal          8.1-13.5        [facility]  
   
                                        Comment on above:   Performed By: #### C  
DP, CP, LIPRF, TSH, FT3, VD25, T4, GLYHGB   
####  
[address] [address]  
[phone]  
: Silvio Nicole MD   
   
                                                    Platelets (Bld)   
[#/Vol]         411 10*3/uL     Normal          138-453         [facility]  
   
                                        Comment on above:   Performed By: #### C  
DP, CP, LIPRF, TSH, FT3, VD25, T4, GLYHGB   
####  
[address] [address]  
[phone]  
: Silvio Nicole MD   
   
                      RBC (Bld) [#/Vol] 4.05 10*6/uL Normal     3.95-5.11  [facility]  
   
                                        Comment on above:   Performed By: #### C  
DP, CP, LIPRF, TSH, FT3, VD25, T4, GLYHGB   
####  
[address] [address]  
[phone]  
: Silvio Nicole MD   
   
                      WBC (Bld) [#/Vol] 15.2 10*3/uL High       3.5-11.3   [facility]  
   
                                        Comment on above:   Performed By: #### C  
DP, CP, LIPRF, TSH, FT3, VD25, T4, GLYHGB   
####  
[address] [address]  
[phone]  
: Silvio Nicole MD   
   
                                                    Comp Metabolic Profon 2022   
   
                      (cont.)               Normal                [facility]  
   
                                        Comment on above:   Result Comment: Aver  
age GFR for 60-69 years old:  
85 mL/min/1.73sq m  
Chronic Kidney Disease:  
<60 mL/min/1.73sq m  
Kidney failure:  
<15 mL/min/1.73sq m  
eGFR calculated using average adult body mass. Additional eGFR   
calculator  
available at:  
http://www.globalrph.Innovate2/multiple_crcl_.htm   
   
                                                            Performed By: #### C  
DP, CP, LIPRF, TSH, FT3, VD25, T4, GLYHGB ####  
[address] [address]  
[phone]  
: Silvio Nicole MD   
   
                      Albumin [Mass/Vol] 4.8 g/dL   Normal     3.5-5.2    [facility]  
   
                                        Comment on above:   Performed By: #### C  
DP, CP, LIPRF, TSH, FT3, VD25, T4, GLYHGB   
####  
[address] [address]  
[phone]  
: Silvio Nicole MD   
   
                      Albumin/Glob Ratio 1.9        Normal     1.0-2.5    [facility]  
   
                                        Comment on above:   Performed By: #### C  
DP, CP, LIPRF, TSH, FT3, VD25, T4, GLYHGB   
####  
[facility]  
[address] [place] [address]  
[phone]  
: Silvio Nicole MD   
   
                      Alkaline Phos 91 U/L     Normal          [facility]  
   
                                        Comment on above:   Performed By: #### C  
DP, CP, LIPRF, TSH, FT3, VD25, T4, GLYHGB   
####  
[facility]  
[address] [place] [address]  
[phone]  
: Silvio Nicole MD   
   
                                                    ALT [Catalytic   
activity/Vol]   17 U/L          Normal          5-33            [facility]  
   
                                        Comment on above:   Performed By: #### C  
DP, CP, LIPRF, TSH, FT3, VD25, T4, GLYHGB   
####  
[facility]  
[address] [place] [address]  
[phone]  
: Silvio Nicole MD   
   
                                                    Anion gap   
[Moles/Vol]     19 mmol/L       High            9-17            [facility]  
   
                                        Comment on above:   Performed By: #### C  
DP, CP, LIPRF, TSH, FT3, VD25, T4, GLYHGB   
####  
[address] [address]  
[phone]  
: Silvio Nicole MD   
   
                                                    AST [Catalytic   
activity/Vol]   17 U/L          Normal          <32             [facility]  
   
                                        Comment on above:   Performed By: #### C  
DP, CP, LIPRF, TSH, FT3, VD25, T4, GLYHGB   
####  
[address] [address]  
[phone]  
: Silvio Nicole MD   
   
                                                    Bilirubin   
[Mass/Vol]      0.27 mg/dL      Low             0.3-1.2         [facility]  
   
                                        Comment on above:   Performed By: #### C  
DP, CP, LIPRF, TSH, FT3, VD25, T4, GLYHGB   
####  
[address] [address]  
[phone]  
: Silvio Nicole MD   
   
                      Calcium [Mass/Vol] 9.6 mg/dL  Normal     8.6-10.4   [facility]  
   
                                        Comment on above:   Performed By: #### C  
DP, CP, LIPRF, TSH, FT3, VD25, T4, GLYHGB   
####  
[address] [address]  
[phone]  
: Silvio Nicole MD   
   
                                                    Chloride   
[Moles/Vol]     96 mmol/L       Low                       [facility]  
   
                                        Comment on above:   Performed By: #### C  
DP, CP, LIPRF, TSH, FT3, VD25, T4, GLYHGB   
####  
[address] [address]  
[phone]  
: Silvio Nicole MD   
   
                      CO2 [Moles/Vol] 20 mmol/L  Normal     20-31      [facility]  
   
                                        Comment on above:   Performed By: #### C  
DP, CP, LIPRF, TSH, FT3, VD25, T4, GLYHGB   
####  
[address] [address]  
[phone]  
: Silvio Nicole MD   
   
                                                    Creatinine   
[Mass/Vol]      1.06 mg/dL      High            0.50-0.90       [facility]  
   
                                        Comment on above:   Performed By: #### C  
DP, CP, LIPRF, TSH, FT3, VD25, T4, GLYHGB   
####  
[address] [address]  
[phone]  
: Silvio Nicole MD   
   
                      GFR, Amer >60        Normal     >60        [facility]  
   
                                        Comment on above:   Performed By: #### C  
DP, CP, LIPRF, TSH, FT3, VD25, T4, GLYHGB   
####  
[address] [address]  
[phone]  
: Silvio Nicole MD   
   
                                                    GFR,non    
Amer            52 mL/min       Low             >60             [facility]  
   
                                        Comment on above:   Performed By: #### C  
DP, CP, LIPRF, TSH, FT3, VD25, T4, GLYHGB   
####  
[address] [address]  
[phone]  
: Silvio Nicole MD   
   
                      Glucose [Mass/Vol] 101 mg/dL  High       70-99      [facility]  
   
                                        Comment on above:   Performed By: #### C  
DP, CP, LIPRF, TSH, FT3, VD25, T4, GLYHGB   
####  
[address] [address]  
[phone]  
: Silvio Nicole MD   
   
                                                    Potassium   
[Moles/Vol]     4.4 mmol/L      Normal          3.7-5.3         [facility]  
   
                                        Comment on above:   Performed By: #### C  
DP, CP, LIPRF, TSH, FT3, VD25, T4, GLYHGB   
####  
[address] [address]  
[phone]  
: Silvio Nicole MD   
   
                      Protein [Mass/Vol] 7.3 g/dL   Normal     6.4-8.3    [facility]  
   
                                        Comment on above:   Performed By: #### C  
DP, CP, LIPRF, TSH, FT3, VD25, T4, GLYHGB   
####  
[facility]  
[address] [place], [place] [address] [phone]  
: Silvio Nicole MD   
   
                      Sodium [Moles/Vol] 135 mmol/L Normal     135-144    [facility]  
   
                                        Comment on above:   Performed By: #### C  
DP, CP, LIPRF, TSH, FT3, VD25, T4, GLYHGB   
####  
[facility]  
[address] [place], [place] [address] [phone]  
: Silvio Nicole MD   
   
                                                    Urea nitrogen   
[Mass/Vol]      27 mg/dL        High            8-23            [facility]  
   
                                        Comment on above:   Performed By: #### C  
DP, CP, LIPRF, TSH, FT3, VD25, T4, GLYHGB   
####  
[facility]  
[facility] [place], [place] [id number] [phone]  
: Silvio Nicole MD   
   
                                                    Comprehensive Metabolic Pane  
[facility] 2022   
   
                          Albumin [Mass/Vol] 4.8 g/dL                  3.5 - 5.2  
   
g/dL                                    [facility]  
   
                                                    Albumin/Globulin   
[Mass ratio]    1.9 {ratio}                                     [facility]  
   
                                                    ALP (Bld)   
[Catalytic   
activity/Vol]   91 U/L                          35 - 104 U/L    [facility]  
   
                                                    ALT [Catalytic   
activity/Vol]   17 U/L                          5 - 33 U/L      [facility]  
   
                                                    Anion gap   
[Moles/Vol]     19 mmol/L       High            9 - 17 mmol/L   [facility]  
   
                                                    AST [Catalytic   
activity/Vol]   17 U/L                          <32             [facility]  
   
                                                    Bilirubin   
[Mass/Vol]          0.27 mg/dL          Low                 0.3 - 1.2   
mg/dL                                   [facility]  
   
                          Calcium [Mass/Vol] 9.6 mg/dL                 8.6 - 10.  
4   
mg/dL                                   [facility]  
   
                                                    Chloride   
[Moles/Vol]         96 mmol/L           Low                 98 - 107   
mmol/L                                  [facility]  
   
                          CO2 [Moles/Vol] 20 mmol/L                 20 - 31   
mmol/L                                  [facility]  
   
                                                    Creatinine   
[Mass/Vol]          1.06 mg/dL          High                0.50 - 0.90   
mg/dL                                   [facility]  
   
                                                    Free PSA/Total PSA   
[Mass fraction]     7.3 g/dL                                6.4 - 8.3   
g/dL                                    [facility]  
   
                                                    GFR    
American        >60                             >60 mL/min      [facility]  
   
                                                    GFR Non-   
American        52 mL/min       Low             >60             [facility]  
   
                                                    GFR/1.73 sq   
M.predicted MDRD   
(S/P/Bld) [Vol   
rate/Area]                                                      [facility]  
   
                                        Comment on above:   Average GFR for 60-6  
9 years old:  
85 mL/min/1.73sq m  
Chronic Kidney Disease:  
<60 mL/min/1.73sq m  
Kidney failure:  
<15 mL/min/1.73sq m  
  
  
eGFR calculated using average adult body mass. Additional eGFR   
calculator available at:  
  
http://www.[employer]/multiple_crcl_2012.htm  
  
  
   
   
                      Glucose [Mass/Vol] 101 mg/dL  High       70 - 99 mg/dL [facility]  
   
                                                    Interpretation and   
review of   
laboratory results Abnormal                                        [facility]  
   
                                                    Potassium   
[Moles/Vol]         4.4 mmol/L                              3.7 - 5.3   
mmol/L                                  [facility]  
   
                          Sodium [Moles/Vol] 135 mmol/L                135 - 144  
   
mmol/L                                  [facility]  
   
                                                    Urea nitrogen   
(BldV) [Mass/Vol] 27 mg/dL        High            8 - 23 mg/dL    [facility]  
   
                                                    Hemoglobin A1Con 2022   
   
                      Glucose [Mass/Vol] 146 mg/dL  Normal                [facility]  
   
                                        Comment on above:   Result Comment: The   
ADA and AACC recommend providing the   
estimated   
average glucose result to  
permit better patient understanding of their HBA1c result.   
   
                                                            Performed By: #### C  
DP, CP, LIPRF, TSH, FT3, VD25, T4, GLYHGB ####  
[facility]  
[address] [place] [address] [phone]  
: Silvio Nicole MD   
   
                                                    HbA1c (Bld) [Mass   
fraction]       6.7 %           High            4.0-6.0         [facility]  
   
                                        Comment on above:   Performed By: #### C  
DP, CP, LIPRF, TSH, FT3, VD25, T4, GLYHGB   
####  
[facility]  
[address], [place] [id number] [phone]  
: Silvio Nicole MD   
   
                                                    Lipid Prof, Fastingon 2022   
   
                                                    Cholesterol   
[Mass/Vol]      193 mg/dL       Normal          <200            [facility]  
   
                                        Comment on above:   Result Comment:  
Cholesterol Guidelines:  
<200 Desirable  
200-240 Borderline  
>240 Undesirable   
   
                                                            Performed By: #### C  
DP, CP, LIPRF, TSH, FT3, VD25, T4, GLYHGB ####  
[address] [id number] [phone]  
: Silvio Nicole MD   
   
                                                    Cholesterol in HDL   
[Mass/Vol]      57 mg/dL        Normal          >40             [facility]  
   
                                        Comment on above:   Result Comment:  
HDL Guidelines:  
<40 Undesirable  
40-59 Borderline  
>59 Desirable   
   
                                                            Performed By: #### C  
DP, CP, LIPRF, TSH, FT3, VD25, T4, GLYHGB ####  
[place] [address]  
[phone]  
: Silvio Nicole MD   
   
                                                    Cholesterol in LDL   
[Mass/Vol]      116 mg/dL       Normal          0-130           [facility]  
   
                                        Comment on above:   Result Comment:  
LDL Guidelines:  
<100 Desirable  
100-129 Near to/above Desirable  
130-159 Borderline  
>159 Undesirable  
Direct (measured) LDL and calculated LDL are not interchangeable   
tests.   
   
                                                            Performed By: #### C  
DP, CP, LIPRF, TSH, FT3, VD25, T4, GLYHGB ####  
[place] [id number] [phone]  
: Silvio Nicole MD   
   
                                                    Cholesterol.total/C  
holesterol in HDL   
[Mass ratio]    3.4 {ratio}     Normal          <5              [facility]  
   
                                        Comment on above:   Performed By: #### C  
DP, CP, LIPRF, TSH, FT3, VD25, T4, GLYHGB   
####  
[address] [id number] [phone]  
: Silvio Nicole MD   
   
                                                    Triglyceride,Fastin  
g               100 mg/dL       Normal          <150            [facility]  
   
                                        Comment on above:   Result Comment:  
Triglyceride Guidelines:  
<150 Desirable  
150-199 Borderline  
200-499 High  
>499 Very high  
Based on AHA Guidelines for fasting triglyceride, 2012.   
   
                                                            Performed By: #### C  
DP, CP, LIPRF, TSH, FT3, VD25, T4, GLYHGB ####  
[facility]  
[address] [place] [address] [phone]  
: Silvio Nicole MD   
   
                                                    Lipid, Fastingon 2022   
   
                                                    Cholesterol   
[Mass/Vol]      193 mg/dL                       <200            [facility]  
   
                                        Comment on above:     
Cholesterol Guidelines:  
<200 Desirable  
200-240 Borderline  
>240 Undesirable  
  
  
   
   
                                                    Cholesterol in HDL   
[Mass/Vol]      57 mg/dL                        >40             [facility]  
   
                                        Comment on above:     
HDL Guidelines:  
<40 Undesirable  
40-59 Borderline  
>59 Desirable  
  
  
   
   
                                                    Cholesterol in LDL   
[Mass/Vol]      116 mg/dL                       0 - 130 mg/dL   [facility]  
   
                                        Comment on above:     
LDL Guidelines:  
<100 Desirable  
100-129 Near to/above Desirable  
130-159  Borderline  
>159 Undesirable  
  
Direct (measured) LDL and calculated LDL are not interchangeable   
tests.  
  
   
   
                                                    Cholesterol.total/C  
holesterol in HDL   
[Mass ratio]    3.4 {ratio}                     <5              [facility]  
   
                                                    Triglyceride,   
Fasting         100 mg/dL                       <150            [facility]  
   
                                        Comment on above:     
Triglyceride Guidelines:  
<150 Desirable  
150-199 Borderline  
200-499 High  
>499 Very high  
Based on AHA Guidelines for fasting triglyceride, 2012.  
  
  
   
   
                                                    No Panel Informationon    
   
                                                                  [facility]  
   
                                                    T3, Freeon 2022   
   
                      Free T3 [Mass/Vol] 4.17 pg/mL Normal     2.02-4.43  [facility]  
   
                                        Comment on above:   Performed By: #### C  
DP, CP, LIPRF, TSH, FT3, VD25, T4, GLYHGB   
####  
[facility]  
[address] [place] [address] [phone]  
: Silvio Nicole MD   
   
                                                    TSHon 2022   
   
                      TSH Qn     3.06 m[IU]/L                       [facility]  
   
                                                    Thyroid Stim. Horm.on 2022   
   
                      Thyroid Stim. Horm. 3.06 uIU/mL Normal     0.30-5.00  [facility]  
   
                                        Comment on above:   Performed By: #### C  
DP, CP, LIPRF, TSH, FT3, VD25, T4, GLYHGB   
####  
[facility]  
[address] [place] [address] [phone]  
: Silvio Nicole MD   
   
                                                    Thyroxine T4on 2022   
   
                      T4 [Mass/Vol] 6.5 ug/dL  Normal     4.5-10.9   [facility]  
   
                                        Comment on above:   Performed By: #### C  
DP, CP, LIPRF, TSH, FT3, VD25, T4, GLYHGB   
####  
[facility]  
[address], [place] [id number] [phone]  
: Silvio Nicole MD   
   
                                                    Vitamin D 25 [place]on 2022  
   
   
                      Vitamin D 25 [place] 57.6 ng/mL Normal     >29.9      [facility]  
   
                                        Comment on above:   Result Comment:  
Reference Range:  
Vitamin D status Range  
Deficiency <20 ng/mL  
Mild Deficiency 20-30 ng/mL  
Sufficiency  ng/mL  
Toxicity >100 ng/mL   
   
                                                            Performed By: #### C  
DP, CP, LIPRF, TSH, FT3, VD25, T4, GLYHGB ####  
[facility] [employer] [facility]  
[address], [place] [id number] [phone]  
: Silvio Nicole MD   
   
                                                    CBC with Auto Differentialon  
 2022   
   
                      Absolute Eos # 0.05                             [facility]  
th  
   
                                                    Absolute Immature   
Granulocyte     0.08                                            [facility]  
   
                      Absolute Lymph # 2.24                             [facility]  
alth  
   
                      Absolute Mono # 1.32       High                  [facility]  
lt  
   
                                                    Basophils (Bld)   
[#/Vol]         0.05 10*3/uL                                    [facility]  
   
                                                    Basophils/100 WBC   
(Bld)           0 %                             0 - 2 %         [facility]  
   
                                                    Eosinophils/100 WBC   
(Bld)           0 %             Low             1 - 4 %         [facility]  
   
                                                    Hematocrit (Bld)   
[Volume fraction] 39.4 %                          36.3 - 47.1 %   [facility]  
   
                                                    Hemoglobin.gastroin  
testinal spec 1 Ql   
(Stl)               12.7 g/dL                               11.9 - 15.1   
g/dL                                    [facility]  
   
                                                    Immature   
granulocytes/100   
WBC (Bld)       1 %             High            0               [facility]  
   
                                                    Interpretation and   
review of   
laboratory results Abnormal                                        [facility]  
   
                                                    Lymphocytes/100 WBC   
(Bld)           15 %            Low             24 - 43 %       [facility]  
   
                                                    MCH (RBC) [Entitic   
mass]               31.4 pg                                 25.2 - 33.5   
pg                                      [facility]  
   
                                                    MCHC (RBC)   
[Mass/Vol]          32.2 g/dL                               28.4 - 34.8   
g/dL                                    [facility]  
   
                                                    MCV (RBC) [Entitic   
vol]                97.3 fL                                 82.6 - 102.9   
fL                                      [facility]  
   
                                                    Monocytes/100 WBC   
(Bld)           9 %                             3 - 12 %        [facility]  
   
                          NRBC Automated 0.0                       0.0 per 100   
WBC                                     [facility]  
   
                                                    Platelet   
distribution width   
(Bld) [Ratio]   13.1 %                          11.8 - 14.4 %   [facility]  
   
                                                    Platelet mean   
volume (Bld)   
[Entitic vol]   10.1 fL                         8.1 - 13.5 fL   [facility]  
   
                                                    Platelets (Bld)   
[#/Vol]         411 10*3/uL                                     [facility]  
   
                          RBC (Bld) [#/Vol] 4.05 10*6/uL              3.95 - 5.1  
1   
m/uL                                    [facility]  
   
                                                    Segmented   
neutrophils/100 WBC   
(Bld)           75 %            High            36 - 65 %       [facility]  
   
                      Segs Absolute 11.43      High                  [facility]  
h  
   
                      WBC (Bld) [#/Vol] 15.2 10*3/uL [facility]  
  
  
  
Encounters  
  
  
                          Encounter Date Encounter Type Care Provider Facility  
   
                          Start: 2024 ambulatory   Oh ALMANZAR Facility  
:[facility] [place]  
   
                          Start: 2024 ambulatory   Oh ALMANZAR Facility:[facility]  
   
                                                    Start: 2023  
End: 2023     ambulatory          JOSE ABRAHAM  Not Available  
   
                                                    Start: 2023  
End: 2023           ambulatory                Delmis Vizcarra MD                           Facility:[facility]  
   
                                                    Start: 2023  
End: 2023     ambulatory          MAKI [facility]         Facility:[facility]  
   
                                                    Start: 2023  
End: 2023     ambulatory          Jose Abraham Facility:[facility]  
   
                                                    Start: 2023  
End: 2023           ambulatory                NARENDRANATH   
LAK[facility]ATHY .                          Facility:[facility]  
   
                                                    Start: 2023  
End: 2023           ambulatory                NARENDRANATH   
LAKSHMIPATHY .                          Facility:[facility]  
   
                                                    Start: 2023  
End: 2023           ambulatory                NARENDRANATH   
[place]SH[place]ATHY .                          Facility:[facility]  
   
                                                    Start: 2023  
End: 2023           ambulatory                NARENDRANBENNY ELI .                          Facility:[facility]  
   
                                                    Start: 2023  
End: 2023     ambulatory          DR MAKI VELIZ .    Facility:[facility]  
   
                                                    Start: 2023  
End: 2023     ambulatory          DR MAKI VELIZ .    Facility:[facility]  
   
                                                    Start: 2023  
End: 2023     ambulatory          DR SAMEER HAGAN .  Facility:[facility]  
   
                                                    Start: 2023  
End: 2023     ambulatory          DR MAKI VELIZ .    Facility:[facility]  
   
                                                    Start: 2023  
End: 2023     ambulatory          DR MAKI VELIZ .    Facility:[facility]  
   
                                                    Start: 2023  
End: 2023     ambulatory          MR REGGIE OCAMPO .  Facility:[facility]  
   
                                                    Start: 2022  
End: 2022                         Subsequent hospital   
visit by physician        Christi Freire PTA        [facility]  
   
                                                    Start: 2022  
End: 2022     ambulatory          DR MAKI VELIZ .    Facility:[facility]  
   
                                                    Start: 12-  
End: 2022     ambulatory          DR MAKI VELIZ .    Facility:[facility]  
   
                                                    Start: 2022  
End: 2022     ambulatory          REGGIE [facility]itch[facility]  
l  
   
                                                    Start: 2022  
End: 2022     ambulatory          MAKI VELIZ       [facility]  
l  
   
                                                    Start: 2022  
End: 2022                         Subsequent hospital   
visit by physician        Christi Freire PTA        [facility]  
   
                                        Comment on above:   Arrived   
   
                                                    Start: 2022  
End: 2022     ambulatory          MAKI Ritchie [facility]  
l  
   
                                                    Start: 2022  
End: 2022                         Subsequent hospital   
visit by physician        Patel Easley PT        [facility]  
   
                                        Comment on above:   Arrived   
   
                                                    Start: 10-  
End: 10-     ambulatory          DR SAMEER HAGAN .  Facility:[facility]  
   
                                                    Start: 2022  
End: 2022     ambulatory          DR SAMEER HAGAN .  Facility:[facility]  
   
                                                    Start: 2022  
End: 2022     ambulatory          DR SAMEER HAGAN .  Facility:[facility]  
   
                                                    Start: 2022  
End: 2022                         Subsequent hospital   
visit by physician                      [facility]   
Schedule                                [facility]  
   
                                        Comment on above:   Arrived   
   
                                                    Start: 2022  
End: 2022     ambulatory          MAKI Ritchie [facility]  
   
                                                    Start: 2022  
End: 2022     ambulatory          DR SAMEER HAGAN .  Facility:[facility]  
   
                          Start: 2022 ambulatory   DR SAMEER HAGAN . Faci  
lity:[facility]  
   
                                                    Start: 2022  
End: 2022     ambulatory          MAKI VELIZ       [facility]  
   
                                                    Start: 2022  
End: 2022                         Subsequent hospital   
visit by physician                      [facility]id Screening   
Schedule                                [facility] Scre[facility]  
   
                                        Comment on above:   Arrived   
   
                                                    Start: 2022  
End: 2022     ambulatory          DR SAMEER HAGAN .  Facility:[facility]  
   
                                                    Start: 2022  
End: 2022     ambulatory          DR SAMEER HAGAN .  Facility:[facility]  
   
                                                    Start: 2022  
End: 2022     ambulatory          DR SAMEER HAGAN .  Facility:[facility]  
   
                                                    Start: 2022  
End: 2022     ambulatory          MAKI VELIZ       [facility]  
   
                                                    Start: 2022  
End: 2022                         Subsequent hospital   
visit by physician                      Maki Veliz MD  
Work Phone:   
[phone]                          [facility]  
   
                                                    Start: 2022  
End: 2022     ambulatory          DR SAMEER HAGAN .  Facility:[facility]  
  
  
  
Procedures  
  
  
                          Date         Procedure    Procedure Detail Performing   
Clinician  
   
                                        Start: 2022   Comprehensive metabo  
lic   
panel                                               Maki Veliz MD  
Work Phone:   
[phone]  
   
                          Start: 2022 Lipid panel               Maki Veliz MD  
Work Phone:   
[phone]  
   
                                        Start: 2015   Microscopic observat  
ion   
[Identifier] in Cervix by   
Cyto stain                                          Maki Veliz MD  
Work Phone:   
[phone]  
  
  
  
Plan of Treatment  
  
  
                          Date         Care Activity Detail       Author  
   
                          Start: 2027 Lipid panel  Lipids       [facility]  
   
                          Start: 2023 ambulatory   Ambulatory   Facility:[facility]  
1  
   
                                                    Start: 2022  
End: 2022           Patient encounter procedure 2022 Appointment   
Physical Therapy   
Christi Freire PTA [facility]   
Therapy  
   
                                                    Start: 2022  
End: 2022           Patient encounter procedure 2022 Appointment   
Physical Therapy   
Christi Freire PTA [facility] Physical   
Therapy  
   
                                                    Start: 2022  
End: 2022           Patient encounter procedure 2022 Appointment   
Physical Therapy   
Christi Freire PTA [facility]   
[facility]rapy  
   
                                                    Start: 2022  
End: 2022           Patient encounter procedure 2022 Appointment   
Physical Therapy   
Christi Freire PTA [facility]   
Therapy  
   
                                                    Start: 2022  
End: 2022           Patient encounter procedure 2022 Appointment   
Physical Therapy   
Christi Freire PTA [facility]   
Therapy  
   
                                        Start: 2022   Pneumococcal 65+ yea  
rs   
Vaccine (1 - PCV)                       Pneumococcal 65+ years   
Vaccine (1 - PCV)                       [facility]  
   
                          Start: 2022 Influenza vaccination              M  
[facility]  
   
                                Start: 2022 Hemoglobin A1c measurement A1C  
 test (Diabetic or   
Prediabetic)                            [facility]  
   
                          Start: 2022 Influenza vaccination Flu vaccine (#  
1) [facility]  
   
                                        Start: 2018   Screening for malign  
ant   
neoplasm of cervix                                  [facility]  
   
                          Start: 2015 Creatinine measurement Creatinine     
[facility]  
   
                                        Start: 2015   Potassium [Moles/vol  
ume] in   
Serum or Plasma           Potassium                 [facility]  
   
                                Start: 2012 Screening for osteoporosis DEX  
A (modify frequency   
per FRAX score)                         [facility]  
   
                                        Start: 2007   Screening for malign  
ant   
neoplasm of breast        Breast cancer screen      [facility]  
   
                                Start: 2007 Shingles vaccine (1 of 2) Shin  
gles vaccine (1 of   
2)                                      [facility]  
   
                                        Start: 2002   Screening for malign  
ant   
neoplasm of colon                                   [facility]  
   
                          Start: 1997 Lipid panel  Lipids       [facility]  
   
                                        Start: 1987   Screening for malign  
ant   
neoplasm of cervix                      HPV (without or with   
Pap)                                    [facility]  
   
                                        Start: 1976   DTaP/Tdap/Td vaccine  
 (1 -   
Tdap)                                   DTaP/Tdap/Td vaccine   
(1 - Tdap)                              [facility]  
   
                          Start: 1975 Hepatitis C screening Hepatitis C sc  
[facility]  
   
                          Start: 1972 HIV screening HIV screen   [facility]  
   
                          Start: 1969 Depression Screen Depression Screen   
[facility]  
   
                          Start: 1962 COVID-19 Vaccine (1) COVID-19 Vaccin  
e (1) [facility]  
   
                          Start: 1958 COVID-19 Vaccine (#1) COVID-19 Vacci  
ne (#1) [facility]  
   
                                                      
End: 2022     COVID-19                                [facility] [employer] [facility]  
Work Phone:   
[phone]  
   
                                        Comment on above:   Once for 1 Occurrenc  
es starting 2022 until 2022   
   
                                                      
End: 2022     COVID-19                                [facility] [employer] Phone:   
[phone]  
   
                                        Comment on above:   Once for 1 Occurrenc  
es starting 2022 until 2022   
   
                                                      
End: 2022                         Hemoglobin   
A1c/Hemoglobin.total in   
Blood                                               [facility] Phone:   
[phone]  
   
                                        Comment on above:   Once for 1 Occurrenc  
es starting 2022 until 2022   
   
                                                      
End: 2022     T4                                      [facility] Phone:   
[phone]  
   
                                        Comment on above:   Once for 1 Occurrenc  
es starting 2022 until 2022   
   
                                                      
End: 2022                         Triiodothyronine (T3) Free   
[Mass/volume] in Serum or   
Plasma                                              [facility] Phone:   
[phone]  
   
                                        Comment on above:   Once for 1 Occurrenc  
es starting 2022 until 2022   
   
                                                      
End: 2022     Vitamin D 25 Hydroxy                     [facility] Phone:   
[phone]  
   
                                        Comment on above:   Once for 1 Occurrenc  
es starting 2022 until 2022   
  
  
  
Payers  
  
  
                          Date         Payer Category Payer        Policy ID  
   
                          2023   Unknown                     
   
                          2022   Medicare                    
   
                          2015   Unknown                   [id number] 1.2.84  
[id number]  
   
                          1960   Medicare                  [id number]  
   
                          1960   Unknown                   [id number]  
   
                          1960   Unknown                   [id number]  
   
                          1957   Unknown                   [id number] 2.16.  
[id number]  
   
                          1957   Unknown                   [id number] 2.16.8  
[id number]  
   
                          1957   Unknown                   [id number] 2.16.8  
[id number]  
   
                          1957   Unknown                   [id number] 2.16.8  
[id number]  
   
                          1957   Unknown                   [id number] 2.16.8  
[id number]  
   
                          1957   Unknown                   [id number] 2.16.8  
[id number]  
   
                          1957   Unknown                   [id number] 2.16.84  
[id number]  
   
                          1957   Unknown                   [id number] 2.16.84  
[id number]  
   
                          1957   Unknown                   [id number] 2.16.84  
[id number]  
   
                          1957   Unknown                   [id number] 2.16.84  
[id number]  
   
                          1957   Unknown                   [id number] 2.16.84  
[id number]  
   
                          1957   Unknown                   [id number] 2.16.84  
[id number]  
   
                          1957   Unknown                   [id number] 2.16.84  
[id number]  
   
                          1957   Unknown                   [id number] 2.16.84  
[id number]  
   
                          1957   Unknown                   [id number] 2.16.84  
[id number]  
   
                          1957   Unknown                   [id number] 2.16.84  
[id number]  
   
                          1957   Unknown                   [id number] 2.16.84  
[id number]  
   
                          1957   Unknown                   [id number] 2.16.84  
[id number]  
   
                          1957   Unknown                   [id number] 2.16.84  
[id number]  
   
                          1957   Unknown                   [id number] 2.16.84  
[id number]  
   
                          1957   Unknown                   [id number] 2.16.84  
[id number]  
   
                          1957   Unknown                   [id number] 2.16.84  
[id number]  
   
                          1957   Unknown                   [id number] 2.16.84  
[id number]  
   
                          1957   Unknown                   [id number] 2.16.84  
[id number]  
   
                          1957   Unknown                   [id number] 2.16.84  
[id number]  
   
                          1957   Unknown                   [id number] 2.16.84  
[id number]  
   
                          1957   Unknown                   [id number] 2.16.84  
[id number]  
   
                          1957   Unknown                   [id number] 2.16.84  
[id number]  
   
                          1957   Unknown                   [id number] 2.16.84  
[id number]  
   
                          1957   Unknown                   [id number] 2.16.8  
[id number]  
   
                          1957   Unknown                   [id number] 2.16.8  
[id number]  
   
                          1957   Unknown                   [id number] 2.16.  
[id number]  
   
                          1957   Unknown                   [id number] 2.16.840  
[id number]  
   
                          1957   Unknown                   [id number] 2.16.8  
[id number]  
   
                                       Unknown                   [id number]  
  
  
  
Social History  
  
  
                          Date         Type         Detail       Facility  
   
                                                    Start: 2012  
End: 2018                         Tobacco smoking status   
NHIS                      Ex-smoker                 [facility]  
   
                                                      
End: 2012     History of tobacco use Current smoker      [facility] Phone:   
[phone]  
   
                                                    Start: 2012  
End: 2018                         Tobacco use and   
exposure                                Smokeless tobacco   
non-user                                [facility] Phone:   
[phone]  
   
                                Start: 2018 Alcohol intake  Current drinke  
r of   
alcohol (finding)                       [facility] Phone:   
[phone]  
   
                          Start: 1957 Sex Assigned At Birth Not on file  M  
[facility] Phone:   
[phone]  
   
                                                      
End: 2012     History of tobacco use Cigarette Smoker    [facility] [employer] Phone:   
[phone]  
  
  
  
Clinical Notes 2022 to 2023  
  Christi Freire, PTA - 2022 3:45 PM Andrey Easley, PT - 2022   
1:30 PM EST  
  
                                Note Date & Type Note            Facility  
   
                                        2023 Note     [id number]  
[id number]  
[id number]  
   
                                        2023 Note     Education Materials  
DR. MANDEL POST OPERATIVE   
SHOULDER INSTRUCTIONS  
SURGEONS WRITTEN INSTRUTCTIONS:  
1. If you have been given a cryo   
cuff after surgery you should use   
it as much as possible for the   
first 24-48 hours. After that it   
is optional. TIP: Many patients   
prefer to use it a little longer   
because it helps reduce pain  
2. You should wiggle your fingers   
frequently  
3. Change your dressings in 1 day.   
If steri-strips have been applied   
DO NOT remove them. When the wound   
is clean and dry you may leave it   
open to air but again DO NOT   
remove any steri-strips that have   
been applied  
4. You may shower in 1 day but do   
not let the water stream directly   
strike the wound  
5. Do pendulum exercises for at   
least 10 minutes twice a day  
6. If you have any problems or   
concerns, please call the office   
at [phone]  
7. Follow up as scheduled               [facility]  
   
                                        2023 Note     [facility] 2SOUT  
Clinical Discharge Summary  
PERSON INFORMATION  
Name SHERRIE LOZADA Age 65   
Years  1957  
Sex FEMALE Language English PCP   
MAKI VELIZ  
Marital Status Single Phone Med   
Service Observation  
MRN [id number] Acct# Arrival   
2023 08:33:12  
Visit Reason SURGERY - LEFT   
REVERSE TOTAL SHOULDER Acuity LOS   
000 26:35  
Address:  
[address] [place], [place] [id number]  
Comment:  
PROVIDER INFORMATION  
VITALS INFORMATION  
Vital Sign Triage Latest  
Temp Oral 35.9 DegC 36.8 DegC  
Temp Temporal  
Temp Intravascular  
Temp Axillary  
Temp Rectal  
02 Sat 100 % 94 %  
Respiratory Rate 16 br/min 18   
br/min  
Peripheral Pulse Rate 56 bpm 70   
bpm  
Apical Heart Rate  
Blood Pressure 138 mmHg / 95 mmHg   
106 mmHg / 70 mmHg  
Comment:  
MEDICAL INFORMATION  
Allergy Info:  
Nickel; Latex; Vicodin; Percocet;   
penicillin  
Medication List:  
Medications That Were Updated -   
Follow Below Instructions  
Other Medications  
Updated: cranberry (Cranberry oral   
capsule) 1 tab(s) Oral 2 times a   
day.  
Medications to Continue That Have   
Not Changed  
Other Medications  
ascorbic acid (Vitamin C 1000 mg   
oral tablet) 3 tab(s) Oral every   
day.  
aspirin (aspirin 81 mg oral   
delayed release tablet) 1 tab(s)   
Oral every day.  
baclofen (baclofen 10 mg oral   
tablet) 1 tab(s) Oral every day.  
biotin (biotin 5000 mcg oral   
capsule) 1 cap(s) Oral every day.  
cholecalciferol (Vitamin D3 1000   
intl units oral tablet) 1 tab(s)   
Oral 2 times a day.  
garlic (Garlic Oil oral capsule) 1   
cap(s) Oral 2 times a day.  
hydroCHLOROthiazide   
(hydroCHLOROthiazide 25 mg oral   
tablet) 1 tab(s) Oral every day.  
liothyronine (liothyronine 5 mcg   
oral tablet) 1 tab(s) Oral every   
day.  
losartan (losartan 25 mg oral   
tablet) 1 tab(s) Oral every day.  
magnesium gluconate (magnesium   
gluconate 250 mg oral tablet) 1   
tab(s) Oral every day.  
metFORMIN (metFORMIN 500 mg oral   
tablet) 1 tab(s) Oral 2 times a   
day.  
metoprolol (metoprolol tartrate 25   
mg oral tablet) 1 tab(s) Oral 2   
times a day.  
omega-3 polyunsaturated fatty   
acids (Fish Oil 1000 mg oral   
capsule) 1 cap(s) Oral 2 times a   
day.  
pantoprazole (pantoprazole 40 mg   
oral delayed release tablet) 1   
tab(s) Oral every day.  
simvastatin (simvastatin 20 mg   
oral tablet) 1 tab(s) Oral every   
day.  
Template Non-Formulary (beet root)   
Oral every day.  
Template Non-Formulary (veggie and   
fruit tablet) Oral 2 times a day.  
traMADol (traMADol 50 mg oral   
tablet) 1 tab(s) Oral Every 12   
hours scheduled time as needed as   
needed for pain.  
zinc gluconate (zinc (as   
gluconate) 50 mg oral tablet) 1   
tab(s) Oral every day.  
Comment:  
Lab and Radiology Results  
Laboratory or Other Results This   
Visit (last charted value for your   
2023 visit)  
Chemistry  
2023 1:31 PM  
Glucose, POC: 108 mg/dL -- Normal   
range between ( 74 and 118 )  
Diagnostic Radiology  
2023 1:47 PM  
XR Shoulder 1 View Left: XR   
Shoulder 1 View Left  
Radiology Report  
2023 1:47 PM  
Radiology Report: Radiology Report  
DIET & ACTIVITY  
Patient Activity Level:  
Patient Diet:  
Regular  
Patient Activity Restrictions:  
DISCHARGE INFORMATION  
Discharge Disposition:  
Discharge Location:  
DEPART REASON INCOMPLETE   
INFORMATION  
PATIENT EDUCATION INFORMATION  
Instructions:  
Leigh- Post Op Shoulder   
(CUSTOM)  
Follow up:  
With: Address: When:  
Jose Abraham [address] [place] [address], [address] [place] [address]  
[phone] Business (1)   
2023 10:45 AM  
DIAGNOSIS  
Arthritis of left glenohumeral   
joint  
Comment:  
PHYS DOC NOTES                          [facility]  
   
                                        2023 Note     [id number]  
[id number]  
[id number]                    [facility]  
   
                                        2023 Note     CLINICAL HISTORY: Po  
stoperative   
evaluation left shoulder   
arthroplasty  
COMPARISON: None available  
TECHNIQUE: Frontal view of the   
left shoulder.  
FINDINGS:  
Postsurgical changes of total   
reverse shoulder arthroplasty.   
Alignment appears anatomic on this   
single frontal view. No   
periprosthetic abnormality. Mild   
degenerative changes of the   
acromioclavicular joint.  
IMPRESSION:  
Postsurgical changes of left   
shoulder arthroplasty.  
***** Final *****  
Signed (Electronic Signature):   
Miguel Petit DO 23 4:32   
pm  
Technologist: YONG MARCUS                     [facility]  
   
                                        Comment on above:   Order Comment: erasmo  
lt status post shoulder replacement   
   
                                        2023 Note     CONSULTATION  
CONSULTATION DATE: 2023  
TO: Maki Veliz M.D.  
CHIEF COMPLAINT: Includes severe   
bilateral hip pain, buttock pain,   
worse on  
the right than left side.  
HISTORY: She describes it at   
5-7/10 pain, burning in character,   
sensitive to  
light touch, also seems to   
increase with activity such as   
standing, walking and  
performing transitioning   
maneuvers. She feels most   
comfortable in the  
semi-recumbent position. Denies   
any change in bowel and bladder   
habits or new  
sensorimotor changes in the lower   
extremities  
EXAM: Notable for patient having   
dysesthesia and hyperesthesia   
overlying the  
distribution of the lateral   
cutaneous branch of the   
iliohypogastric nerve  
bilaterally, more significant on   
the right than the left side.  
IMPRESSION: Our impression is   
patient with chronic pain   
secondary to neuritis  
involving the lateral cutaneous   
branch of the iliohypogastric   
nerve bilaterally,  
worse on the right than the left   
side. She has undergone a   
diagnostic  
injection bilaterally, of the same   
nerve, under fluoroscopic   
guidance. She  
reports the pain symptoms were   
improved by 100% starting in the   
immediate post  
procedural period, lasting for   
several hours, with recurrence of   
pain back to  
her baseline.  
RECOMMENDATIONS: I recommend   
proceeding with a rhizotomy using   
radiofrequency  
ablation of the lateral cutaneous   
branch of the iliohypogastric   
nerve, starting  
on the right side initially and   
then proceeding to the left side,   
approximately  
1-2 weeks lateral. I have asked   
her to trial Zonegran 50 mg at   
h.s. and  
re-trial Zanaflex one week after   
starting her Zonegran.  
As part of providing excellent,   
safe, comprehensive care, the   
following was  
completed at our patient's visit:  
1. A medication reconciliation and   
review to ensure accurate   
knowledge of  
current/active medications,   
including asking our patients to   
inform us about  
any over-the-counter medications   
or herbal remedies/nutritional  
supplements/alternative remedies.  
2. A review to specifically ensure   
our patients have had annual   
screening for:  
elevated body mass index (BMI, see   
intake chart for exact total),   
tobacco use,  
screening for depression, and   
screening for unhealthy alcohol   
use. When  
screening is concerning, patients   
are provided with education and   
the specific  
recommendation to discuss the   
concerning health issue and   
treatment options  
with their primary care provider.       [facility]  
   
                                        2023 Note     CONSULTATION  
CONSULTATION DATE: 2023  
TO: Dr. Maki Veliz and Dr. Sherrie Osman  
CHIEF COMPLAINT: Includes severe   
bilateral lower back pain.  
HISTORY OF PRESENT ILLNESS: She   
presented today complaining of   
5-7/10 pain in  
her lower back, occurring   
bilaterally, describes it as sharp   
pain, increased  
with activities such as standing,   
walking and performing   
transitioning  
maneuvers. She feels most   
comfortable in the semi-recumbent   
position. Denies  
any change in bowel and bladder   
habits or new sensorimotor changes   
in the lower  
extremities.  
EXAM: Notable for the patient   
having a non-focal sensorimotor   
exam of the  
lower extremities. DTRs were   
symmetrical. She has a negative   
straight leg  
raise. She had no clinical   
myelopathy involving the lower   
extremities. The  
patient did have tenderness along   
the SI joints bilaterally, as well   
as  
positive bilateral Gaenslen's   
maneuver, pelvic compression test   
and Billy's  
test. Patient had significant   
myofascial dysfunction along the   
lumbar  
paravertebral muscles occurring   
bilaterally. She also had   
significant  
dysesthesias and hypoesthesia long   
the distribution of the lateral   
cutaneous  
and hypogastric nerve, also   
occurring bilaterally.  
IMPRESSION: Patient with chronic   
pain secondary to SI joint   
dysfunction.  
RECOMMENDATIONS: I recommended   
bilateral SI joint injection under   
fluoroscopy  
guidance. I have actually   
discontinued Flexeril secondary to   
ineffectiveness.  
Will trial her on Zanaflex 4 mg   
pill, half a pill to one pill up   
to b.i.d. as  
tolerated. I have asked her to   
discontinue her Xanax and have   
also recommended  
Narcan spray available. We did   
discuss these benzodiazepines and   
offered  
analgesics with the patient. All   
her questions answered. She agrees   
to  
proceed with the outlined plan.         [facility]  
   
                                        2023 Note     CONSULTATION  
CONSULTATION DATE: 2023  
HISTORY OF PRESENT ILLNESS: This   
is a 65-year-old female who   
returns to the  
clinic for a three month follow   
up. She was last seen at the end   
of October  
and had pain 8/10. At that time,   
she received bilateral trapezius   
and trigger  
point injections, which she   
reports next to no relief. The   
patient states her  
pain is 8/10 today. She has   
bilateral anterior shoulder pain,   
which she was  
told by her PCP is tendonitis, and   
diffuse lower back pain. She did   
go to Dr. Sherwood's office and they   
proceeded to do lumbar imaging,   
lumbar MRI which  
does show significant disc   
herniation between L5 and S1. The   
patient does have  
a Neurosurgery appointment pending   
with Dr. Sherrie Osman in   
[place]  
[facility]. She also feels she   
is having spasms in her back. She   
can walk  
for short periods of time, but   
then she needs to sit down. She   
did have  
radiofrequency ablations in August   
of this year which afforded her   
minimal to  
no relief. Current medication   
include diclofenac 75 mg b.i.d.,   
tramadol 50 mg  
b.i.d. p.r.n., baclofen 10 mg,   
which she stopped taking due to   
being  
ineffective, and a vitamin   
regimen. She has recently been   
placed on metformin  
and has had approximately a 20   
pound weight loss. The patient is   
very  
discouraged with this pain at this   
time. We had discussed, in the   
past,  
increasing different pain   
medications, but she does have an   
allergy to  
hydrocodone and oxycodone. Patient   
does feel better when she bends   
forward or  
when she is sitting down. Standing   
and walking and doing housework   
are the  
most painful activities for her.  
Patient's REVIEW OF SYSTEMS / PAST   
MEDICAL HISTORY / ALLERGIES and   
IMAGES have  
been reviewed and noted on the   
chart.  
PHYSICAL EXAM:  
VITAL SIGNS: Blood pressure   
105/70, heart rate is 59.   
Temperature is 96.9.  
She is 5'4 , weighs 108 kg.  
GENERAL IMPRESSION: Pleasant,   
appropriate, obvious discomfort   
sitting in the  
chair.  
FOCUSED EXAM - BACK: Range of   
motion is guarded in lateral   
rotation and  
flexion/extension. Paravertebral   
muscles are taut but   
non-spasmodic.  
Tenderness is reproduced upon   
compression along the lower lumbar   
facets of L3,  
L4, L5 bilaterally. Josse's point   
is non-tender at this time.  
MUSCULOSKELETAL: Bilateral upper   
extremities motor is 4/5   
bilaterally. Range  
of motion of bilateral shoulders   
is guarded in lateral raises and   
adduction.  
Point tenderness along bilateral   
bicipital head tendon sheath and   
compression  
reproduces the patient's pain   
pattern. Lower extremities   
bilaterally motor is  
4/5. Patient walks unassisted with   
a stable gait.  
NEUROLOGICAL: Radicular sensory is   
intact. Negative polyneuropathy.  
DIAGNOSIS: Bilateral bicipital   
head tendon sheath tendonitis,   
lumbar  
degenerative disc disease, lumbar   
disc displacement and lumbar   
spondylosis.  
PLAN: Patient will receive   
bilateral bicipital tendon sheath   
injections in the  
office today, which she does   
consent to. We will increase her   
tramadol to 100  
mg b.i.d. We will also start her   
on Flexeril 10 mg q.h.s. and I   
encouraged her  
to use a menthol heat rub with   
heat application. We will see her   
in three  
months' time, unless otherwise   
indicated. We did ask that she   
call the office  
with an update after her   
Neurosurgery appointment in early   
February. Patient  
agrees with this plan.                  [facility]  
   
                                        2023 Note     CONSULTATION  
PROCEDURE DATE: 2023  
PREOPERATIVE DIAGNOSIS: Bilateral   
bicipital head sheath tendonitis.  
POSTOPERATIVE DIAGNOSIS: Bilateral   
bicipital head sheath tendonitis.  
PROCEDURE: Bilateral bicipital   
head tendon sheath injection.  
Subsequent to obtaining informed   
consent, the patient was placed in   
the upright  
sitting position. Alcohol prep was   
used to sterilize each location. A   
25  
gauge needle with 0.125% Marcaine   
and 40 mg of Kenalog was placed   
was divided  
into two locations. The needle was   
placed to rest inside each   
bicipital head  
tendon. Negative heme. Medication   
was injected in a slow pattern.   
Patient  
tolerated the procedure well and   
will be followed up in the clinic.      [facility]  
   
                                                    2022 History of   
Present illness Narrative               Formatting of this note might be   
different from the original.  
[place]  
Inpatient/Observation/Outpatient   
Rehabilitation  
  
Date: 2022  
Patient Name: Sherrie Lozada []   
Inpatient Acute/Observation [x]   
Outpatient  
: 1957  
  
[x] Pt cancelled due to: [] No   
Reason Given [] Sick/ill [x]   
Other:  
No reason given to    
for today's cancelled visit,   
however PTA did speak with patient   
earlier in the week and she stated   
she had some medical issues going   
on that she is seeking a doctor's   
advice for.  
  
Therapist/Assistant will attempt   
to see this patient, at our   
earliest opportunity.  
  
Christi Freire, PTA [id number] Date:   
2022  
  
Electronically signed by Patel Easley, PT at 2022 10:23 AM   
EST  
documented in this encounter            [employer]  
Work Phone:   
[phone]  
   
                                                    2022 History of   
Present illness Narrative               Formatting of this note is   
different from the original.  
Phone: [phone] [facility]  
Fax: [phone] Outpatient   
Physical Therapy  
Evaluation  
Date: 2022  
Patient: Sherrie Lozada  
: 1957  
CSN #: [id number]  
  
Referring Physician: Reggie Ocampo PA-C  
  
Medical Diagnosis: R hip pain,   
M25.551  
Treatment Diagnosis: Low back and   
R hip pain  
PT Insurance Information: [employer]  
Total # of Visits Approved: 12  
Total # of Visits to Date: 1  
No Show: 0  
Canceled Appointment: 0  
  
Subjective  
Subjective: Patient reports hx of   
back pain for years and R hip pain   
started recently. Had xrays for R   
hip on Friday that showed   
trochanteric bursitis and liner of   
OLGA LIDIA is wearing down. MRI pending   
insurance approval. 8/10 pain   
after work and being on her feet   
all day. Heat packs and cold packs   
that provide temporary relief but   
then it comes right back. Pain   
medications don't help.  
Additional Pertinent Hx: Hx of B   
OLGA LIDIA's, arthritis, HTN, anxiety  
  
Observations:  
General Observations  
Description: Pt stands with lumbar   
hyperlordosis and B anterior   
pelvic tilt, Moderate TTP L5/S1, R   
PSIS, R greater trochanter; (-) B   
slump, SLR, (-) B MICKIE, good   
pelvic alignment, relief with   
manual lumbar traction with   
therapy ball  
  
Objective  
  
Strength  
  
Strength LLE  
L Hip Flexion: 4-/5  
L Hip ABduction: 4/5  
L Hip ADduction: 4/5  
L Knee Flexion: 4/5  
L Knee Extension: 4-/5  
L Ankle Dorsiflexion: 4/5  
  
Lumbar Assessment  
AROM Lumbar Spine  
Lumbar spine general AROM:   
flexion: 6 in from floor, no pain;   
B SB: to knees, no pain  
  
Special Tests:  
Strength RLE  
R Hip Flexion: 3/5  
R Hip ABduction: 4/5  
R Hip ADduction: 4/5  
R Knee Flexion: 4/5  
R Knee Extension: 4-/5  
R Ankle Dorsiflexion: 4-/5  
Strength LLE  
L Hip Flexion: 4-/5  
L Hip ABduction: 4/5  
L Hip ADduction: 4/5  
L Knee Flexion: 4/5  
L Knee Extension: 4-/5  
L Ankle Dorsiflexion: 4/5  
  
Exercises:  
Exercise 1: HEP: PPT, marching,   
glut sets, LTR  
  
Manual:  
Manual Traction: Gentle manual   
lumbar traction with therapy ball   
with relief of pain noted  
  
  
Functional Outcome Measures  
Get out of bed: Somewhat difficult  
Sleep through the night: Somewhat   
difficult  
Turn over in bed: Somewhat   
difficult  
Ride in a car: Minimally difficult  
Stand up for 20-30 minutes: Fairly   
difficult  
Sit in a chair for several hours:   
Somewhat difficult  
Climb one flight of stairs: Fairly   
difficult  
Walk a few blocks (300-400 m):   
Fairly difficult  
Walk several kilometers - miles:   
Unable to do  
Reach up to high shelves:   
Minimally difficult  
Throw a ball: Unable to do  
Run one block (about 100 m):   
Unable to do  
Take food out of the refrigerator:   
Somewhat difficult  
Make your bed: Fairly difficult  
Put on socks (pantyhose): Somewhat   
difficult  
Bend over to clean the bathtub:   
Very difficult  
Move a chair: Somewhat difficult  
Pull or push heavy doors: Somewhat   
difficult  
Carry two bags of groceries:   
Somewhat difficult  
Lift and carry a heavy suitcase:   
Very difficult  
[place] Total Score: 55  
  
Assessment  
Assessment: Patient is 65 year old   
female with dx of R hip pain who   
presents with lower back and R hip   
pain 8/10 at worst after working   
all day on her feet.Pt stands with   
lumbar hyperlordosis and B   
anterior pelvic tilt, Moderate TTP   
L5/S1, R PSIS, R greater   
trochanter; (-) B slump, SLR, (-)   
B MICKIE, good pelvic alignment,   
relief with manual lumbar traction   
with therapy ball. Patient with   
decreased lumbar AROM flexion: 6   
in from floor with pain and B SB:   
to knees with no pain. Patient   
with decreased core strength: 3+/5   
and decreased R hip flex: 3/5   
compared to L hip flex: 4-/5.   
Patient to benefit from aquatic   
physical therapy to offload the   
spine and the R hip and to improve   
strength and ROM to return to   
PLOF.  
Therapy Prognosis: Good  
  
  
Decision Making: Low Complexity  
  
Patient Education  
PT eval, POC, HEP  
Pt verbalized/demonstrated good   
understanding: [X] Yes [] No, pt   
required further clarification.  
  
Goals  
Short Term Goals  
Time Frame for Short Term Goals: 3   
weeks  
Short Term Goal 1: Patient to   
initiate HEP for improved lumbar   
ROM and core strength.  
Short Term Goal 2: Patient to   
tolerate 45 min of aquatic   
exercise to decrease low back and   
L hip pain.  
Short Term Goal 3: Patient to be   
instructed in gentle core and B   
hip strengthening to improve   
lumbar stability.  
  
Long Term Goals  
Time Frame for Long Term Goals : 6   
weeks  
Long Term Goal 1: Patient to be   
safe and independent with HEP.  
Long Term Goal 2: Patient to have   
improved core and B hip strength   
>/=4/5 all planes for improved   
lumbar stability.  
Long Term Goal 3: Patient to have   
improved lumbar AROM flexion: 4in   
from floor with no increase in   
pain.  
Long Term Goal 4: Patient to   
report >/=75% improvement in   
symptoms for improved QOL.  
  
Minutes Tracking:  
Time In: 1330  
Time Out: 1410  
Minutes: 40  
Timed Code Treatment Minutes: 39   
Minutes  
  
Patel Easley PT, DPT 2022  
  
Electronically signed by Patel Easley PT at 2022 2:25 PM   
EST  
documented in this encounter            [employer]  
Work Phone:   
[phone]  
   
                                        10- Note     CONSULTATION  
PROCEDURE DATE: 10/26/222  
PREOPERATIVE DIAGNOSIS: Bilateral   
thoracic trapezius spasms and   
bilateral  
lumbar paravertebral spasms.  
POSTOPERATIVE DIAGNOSIS: Bilateral   
thoracic trapezius spasms and   
bilateral  
lumbar paravertebral spasms.  
PROCEDURE: Bilateral thoracic   
trapezius injections and bilateral   
lumbar  
paravertebral injections.  
Subsequent to obtaining informed   
consent, the patient was placed in   
upright  
standing forward flexion position.   
A 25 gauge needle with 2 cc   
Marcaine, 1 cc  
40 mg of Kenalog, and 2 cc of   
normal saline, was divided into   
four locations.  
Needle was placed to rest inside   
each location of the trigger   
points. Negative  
heme. Medication was injected in a   
slow, fan-like pattern. Patient   
tolerated  
the procedure well with no overt   
complications, will be followed up   
in the  
office in three months' time.           [facility]  
   
                                        2022 Note     CONSULTATION  
CONSULTATION DATE: 2022  
HISTORY OF PRESENT ILLNESS: This   
is a pleasant, 64-year-old female   
returning  
to the clinic status post   
bilateral lumbar RFAs of L2, L3   
and L4, L5 completed  
on 2022. The patient states   
she is uncertain of the relief as   
she is  
having increased pressure, ache   
and tightening to her lumbar   
region. She  
reports her pain with activity   
7-8/10 and in the standing   
position. Standing,  
working, physical activity is   
aggravating to her pain. The pain   
decreases to  
2/10 with sitting. Patient is a   
 at a restaurant. Current   
medications  
include diclofenac 50 mg b.i.d.,   
tramadol 50 mg b.i.d. by her PCP   
for shoulder  
pain, and Xanax p.r.n. She denies   
any new radicular pain, radiating   
pain or  
vasomotor changes.  
Patient's REVIEW OF SYSTEMS / PAST   
MEDICAL HISTORY / ALLERGIES and   
IMAGES have  
been reviewed and they are noted   
on the chart.  
PHYSICAL EXAM:  
VITAL SIGNS: Blood pressure   
167/95, heart rate is 85.   
Temperature is 97.3.  
She is 5'4 , weighs 116.4 kg.  
GENERAL IMPRESSION: Pleasant,   
appropriate, no acute distress.  
FOCUSED EXAM - BACK: Significant   
paravertebral spasming noted to   
lower  
thoracic and lower lumbar regions.   
Compression along these muscles   
reproduced  
the patient's pain symptomatology.   
No reproduction of spinal axial   
pain upon  
compression along the lower lumbar   
facets, indicative of successful   
RFA.  
Josse's point non-tender.   
Negative FABERs and compression   
test. Range of  
motion is functional in lateral   
rotation and flexion/extension.  
MUSCULOSKELETAL: Motor is intact   
bilateral lower extremities, 4/5   
bilaterally.  
Slight muscle atrophy noted to   
bilateral quadriceps. Patient   
walks with an  
antalgic gait without an assistive   
device.  
NEUROLOGICALLY: Radicular sensory   
is intact. Negative   
polyneuropathy.  
DIAGNOSIS: Lumbar and thoracic   
paravertebral spasms, lumbar   
spondylosis,  
lumbar degenerative disc disease   
and chronic lower back pain.  
PLAN: The patient will start on   
baclofen 10 mg q.h.s. We will   
preauthorize  
for thoracic and lumbar   
paravertebral trigger point   
injections. She was  
instructed to use Voltaren 1% gel   
mixed with Vicks VapoRub to her   
back with  
heat application to follow.   
Stretches were demonstrated and   
encouraged as well.  
Upon authorization, patient will   
be brought back to the clinic to   
receive her  
trigger point injections and   
patient agrees to move forward.         [facility]  
   
                                        2022 Note     CONSULTATION  
CONSULTATION DATE: 2022  
CHIEF COMPLAINT: Low back pain.  
HISTORY OF PRESENT ILLNESS: This   
is a very pleasant, 64-year-old   
female who  
has had successful diagnostic   
medial branch blocks on two   
separate occasions.  
Both times afforded the patient   
% relief for 24 hours or so.   
The patient  
works as a . The patient   
currently reports the pain as a   
6/10, worse  
with walking. The patient enjoys   
walking. The patient currently   
takes  
diclofenac 75 mg b.i.d., tramadol   
50 mg b.i.d. but does not take   
this since it  
did not help her, Xanax on a   
p.r.n. basis and has been taking a   
vitamin regimen.  
The patient is focused now with   
regards to her own wellness and   
health.  
The patient's PAST MEDICAL HISTORY   
/ SURGICAL HISTORY / REVIEW OF   
SYSTEMS are  
noted on the chart, along with the   
MEDICATION LIST / ALLERGIES and   
RADIOLOGICAL  
IMAGES.  
PHYSICAL EXAMINATION:  
GENERAL: Upon physical   
examination, this is a pleasant,   
cooperative female who  
does not appear to be in any acute   
distress.  
VITAL SIGN: Slightly elevated at   
135/92 with a heart rate of 62. At   
a height  
of 5'4 , the patient weighs 116   
kg.  
FOCUSED EVALUATION: Extension,   
compression, direct palpation   
along the  
posterior elements aggravate the   
patient's pain concordant with   
facet  
arthropathy, lumbar spondylosis.  
EXTREMITIES: No pedal edema is   
noted. Quality of the muscles is   
poor; however,  
within normal limits.  
NEUROLOGICALLY: The patient does   
not have any radicular   
symptomatology.  
PSYCHIATRICALLY: Affect is   
appropriate.  
IMPRESSION: Chronic vertebrogenic   
low back pain, lumbar degenerative   
disc  
disease, lumbar spondylosis.  
PLAN: We will do a U-Tox in the   
office today. Questions/answers   
were done.  
The patient will be scheduled for   
a therapeutic rhizotomy   
radiofrequency  
ablation of the dorsal rami at the   
level of L2-L3 and L4-L5   
bilaterally,  
initially on the right hand side,   
followed by the left hand side.   
The patient  
understands and would like to   
proceed.  
IFC Signed and Approved by: DR SAMEER HAGAN .  
2022 09:28:00                     [facility]  
   
                                        2022 Note     CONSULTATION  
CONSULTATION DATE: 2022  
This is a pleasant 64-year-old   
female returning to the clinic   
status post #1  
bilateral MBB to L2, L3 and L4, L5   
that afforded her 100% relief for   
two days.  
Following that her pain returned   
to baseline which was is 6 out of   
10. The  
patient reports activity such as   
standing, walking, ADLs and   
physical activity  
as aggravating factors; sitting   
and sleeping relieves her pain.   
She currently  
takes Tylenol, diclofenac 50 mg   
b.i.d., multivitamin and fish oil.   
She is  
pleased with the results and would   
like to move forward with the   
second  
injection. The patient is somewhat   
concerned that she has a   
2-day-long  
shopping trip with her daughter   
coming up this weekend and she is   
afraid her  
pain will become too great with   
all the walking. She denies any   
new radicular  
pain or vasomotor changes.  
REVIEW OF SYSTEMS, PAST MEDICAL   
HISTORY, ALLERGIES AND IMAGES:   
Have been  
reviewed and noted in the chart.  
PHYSICAL EXAM:  
VITAL SIGNS: Blood pressure 96/66,   
heart rate is 56, temperature is   
97.4.  
Height is 5'4 , weighs 115.6 kg.  
GENERAL APPEARANCE: Pleasant,   
appropriate and in no acute   
distress sitting in  
the chair.  
FOCUSED EXAM:  
BACK: Range of motion is guarded   
in lateral rotation and   
flexion/extension.  
Reproduction of spinoaxial pain   
noted to direct compression along   
the posterior  
elements of the facets of L2, L3,   
and L4, L5 which was concordant   
with ill  
facet arthropathy, lumbar   
spondylosis. Josse's point mildly   
tender to the  
right with referral pain to the   
right hip.  
MUSCULOSKELETAL: Motor is intact,   
4 out of 5 bilaterally. The   
patient  
ambulates with a stable gait.  
NEUROLOGICAL: Negative   
polyneuropathy, bilateral patellar   
reflexes +2.  
DIAGNOSIS: Lumbar spondylosis,   
lumbar degenerative disk,   
spinoaxial lower back  
pain, obesity.  
PLAN: We will move forward with   
the #2 bilateral MBB to L2, 3 and   
L4, L5  
pending insurance authorization.   
In regards to her upcoming trip, I   
will give  
her 3 days' worth of Tramadol 50   
mg b.i.d. and dispense 6 tablets.   
The patient  
was made aware of the risks and   
benefits of this medication. In   
the meantime,  
nutrition importance was discussed   
as was vitamins. She is to   
continue with  
heat rub and heat source daily to   
her back. The patient agrees with   
the plan  
of care and would like to move   
forward and she will be followed   
up in the  
office post-procedure.  
IFC Signed and Approved by: JAI MURRAY .  
2022 15:07:00                     [facility]  
  
  
  
Advance Directives  
No Advanced Directives Records FoundDocuments on File  
  
                          Type         Date Recorded Patient Representative Expl  
anation  
   
                          ACP-Advance Directive                             
   
                          ACP-Power of                              
  
                           Latest Code Status on File  
  
                          Code Status  Date Activated Date Inactivated Comments  
   
                          Full Code    2012 4:45 PM 2012 8:39 AM   
  
  
  
                                                                   
   
                          Full Code    2012 4:33 PM 2012 7:14 PM   
  
  
  
Summary Purpose  
  
  
                                                      
  
  
  
Family History  
No Family History Records FoundNo Family History Records FoundNo Family History 
Records FoundNo Family History Records FoundNo Family History Records FoundNo 
Family History Records FoundNo Family History Records Found  
  
Additional Source Comments  
  
  
  
                                                    Care Teams (unrecognized sec  
tion and content)  
   
                                          
  
  
  
                      Team Member Relationship Specialty  Start Date End Date  
   
                                                      
  
  
Maki Veliz MD  
  
  
[address] [id number]  
  
  
[phone] (Work)  
  
  
[phone] (Fax) PCP - General                   9/10/12           
  
  
  
                      Team Member Relationship Specialty  Start Date End Date  
   
                                                      
  
  
Maki Veliz MD  
  
  
[address] [id number]  
  
  
[phone] (Work)  
  
  
[phone] (Fax) PCP - General                   9/10/12           
  
  
  
                      Team Member Relationship Specialty  Start Date End Date  
   
                                                      
  
  
Maki Veliz MD  
  
  
[address] [id number]  
  
  
[phone] (Work)  
  
  
[phone] (Fax) PCP - General                   9/10/12           
  
  
  
                      Team Member Relationship Specialty  Start Date End Date  
   
                                                      
  
  
Maki Veliz MD  
  
  
[address] [place] [address]  
  
  
[phone] (Work)  
  
  
[phone] (Fax) PCP - General                   9/10/12           
  
  
  
  
  
                                                    INFORMATION SOURCE (unrecogn  
ized section and content)  
   
                                          
  
  
  
                                        DATE CREATED        AUTHOR  
   
                                2022                      [facility]  
  
  
  
                                DATE CREATED    AUTHOR          AUTHOR'S ORGANIZ  
ATION  
   
                                2023                      [facility]  
pital  
  
  
  
                                DATE CREATED    AUTHOR          AUTHOR'S ORGANIZ  
ATION  
   
                                2023                      [facility]  
pital  
  
  
  
                                DATE CREATED    AUTHOR          AUTHOR'S ORGANIZ  
ATION  
   
                                10/06/2023                      [facility]  
  
  
  
                                DATE CREATED    AUTHOR          AUTHOR'S ORGANIZ  
ATION  
   
                                2023                      [facility]  
  
  
  
                                DATE CREATED    AUTHOR          AUTHOR'S ORGANIZ  
ATION  
   
                                2023                      [facility]  
  
  
  
                                DATE CREATED    AUTHOR          AUTHOR'S ORGANIZ  
ATION  
   
                                2024                      [facility]  
  
  
FOR RECORDS PERTAINING TO PATIENTS WHO ARE OR HAVE BEEN ENROLLED IN A CHEMICAL 
DEPENDENCY/SUBSTANCEABUSE PROGRAM, SOME INFORMATION MAY BE OMITTED. This 
clinical summary was aggregated from multiple sources. Caution should be 
exercised in using it in the provision of clinical care. This summary normalizes
 information from multiple sources, and as a consequence, information in this 
document may materially change the coding, format and clinical context of 
patient data. In addition, data may be omitted in some cases. CLINICAL DECISIONS
 SHOULD BE BASED ON THE PRIMARY CLINICAL RECORDS. [facility] [employer] [facility]. provides
 no warranty or guarantee of the accuracy or completeness of information in this
 document. Postpartum care following vaginal delivery

## 2024-07-04 ENCOUNTER — NON-APPOINTMENT (OUTPATIENT)
Age: 39
End: 2024-07-04

## 2024-07-17 ENCOUNTER — EMERGENCY (EMERGENCY)
Facility: HOSPITAL | Age: 39
LOS: 1 days | Discharge: HOME | End: 2024-07-17
Attending: STUDENT IN AN ORGANIZED HEALTH CARE EDUCATION/TRAINING PROGRAM | Admitting: STUDENT IN AN ORGANIZED HEALTH CARE EDUCATION/TRAINING PROGRAM
Payer: COMMERCIAL

## 2024-07-17 VITALS
OXYGEN SATURATION: 100 % | TEMPERATURE: 97 F | SYSTOLIC BLOOD PRESSURE: 109 MMHG | HEART RATE: 79 BPM | RESPIRATION RATE: 17 BRPM | DIASTOLIC BLOOD PRESSURE: 76 MMHG

## 2024-07-17 VITALS
RESPIRATION RATE: 18 BRPM | OXYGEN SATURATION: 100 % | DIASTOLIC BLOOD PRESSURE: 66 MMHG | SYSTOLIC BLOOD PRESSURE: 96 MMHG | HEART RATE: 79 BPM | TEMPERATURE: 99 F

## 2024-07-17 PROBLEM — Z86.69 PERSONAL HISTORY OF OTHER DISEASES OF THE NERVOUS SYSTEM AND SENSE ORGANS: Chronic | Status: ACTIVE | Noted: 2022-01-24

## 2024-07-17 PROBLEM — L93.0 DISCOID LUPUS ERYTHEMATOSUS: Chronic | Status: ACTIVE | Noted: 2022-01-24

## 2024-07-17 LAB
ALBUMIN SERPL ELPH-MCNC: 4 G/DL — SIGNIFICANT CHANGE UP (ref 3.3–5)
ALP SERPL-CCNC: 76 U/L — SIGNIFICANT CHANGE UP (ref 40–120)
ALT FLD-CCNC: 8 U/L — SIGNIFICANT CHANGE UP (ref 4–33)
ANION GAP SERPL CALC-SCNC: 12 MMOL/L — SIGNIFICANT CHANGE UP (ref 7–14)
AST SERPL-CCNC: 14 U/L — SIGNIFICANT CHANGE UP (ref 4–32)
BASOPHILS # BLD AUTO: 0.02 K/UL — SIGNIFICANT CHANGE UP (ref 0–0.2)
BASOPHILS NFR BLD AUTO: 0.3 % — SIGNIFICANT CHANGE UP (ref 0–2)
BILIRUB SERPL-MCNC: 1.5 MG/DL — HIGH (ref 0.2–1.2)
BUN SERPL-MCNC: 8 MG/DL — SIGNIFICANT CHANGE UP (ref 7–23)
CALCIUM SERPL-MCNC: 9.2 MG/DL — SIGNIFICANT CHANGE UP (ref 8.4–10.5)
CHLORIDE SERPL-SCNC: 103 MMOL/L — SIGNIFICANT CHANGE UP (ref 98–107)
CO2 SERPL-SCNC: 20 MMOL/L — LOW (ref 22–31)
CREAT SERPL-MCNC: 0.8 MG/DL — SIGNIFICANT CHANGE UP (ref 0.5–1.3)
EGFR: 96 ML/MIN/1.73M2 — SIGNIFICANT CHANGE UP
EOSINOPHIL # BLD AUTO: 0.25 K/UL — SIGNIFICANT CHANGE UP (ref 0–0.5)
EOSINOPHIL NFR BLD AUTO: 3.7 % — SIGNIFICANT CHANGE UP (ref 0–6)
GLUCOSE SERPL-MCNC: 96 MG/DL — SIGNIFICANT CHANGE UP (ref 70–99)
HCG SERPL-ACNC: <1 MIU/ML — SIGNIFICANT CHANGE UP
HCT VFR BLD CALC: 40.4 % — SIGNIFICANT CHANGE UP (ref 34.5–45)
HGB BLD-MCNC: 13.2 G/DL — SIGNIFICANT CHANGE UP (ref 11.5–15.5)
IANC: 4.84 K/UL — SIGNIFICANT CHANGE UP (ref 1.8–7.4)
IMM GRANULOCYTES NFR BLD AUTO: 0.3 % — SIGNIFICANT CHANGE UP (ref 0–0.9)
LIDOCAIN IGE QN: 29 U/L — SIGNIFICANT CHANGE UP (ref 7–60)
LYMPHOCYTES # BLD AUTO: 1.36 K/UL — SIGNIFICANT CHANGE UP (ref 1–3.3)
LYMPHOCYTES # BLD AUTO: 20.1 % — SIGNIFICANT CHANGE UP (ref 13–44)
MCHC RBC-ENTMCNC: 29.7 PG — SIGNIFICANT CHANGE UP (ref 27–34)
MCHC RBC-ENTMCNC: 32.7 GM/DL — SIGNIFICANT CHANGE UP (ref 32–36)
MCV RBC AUTO: 91 FL — SIGNIFICANT CHANGE UP (ref 80–100)
MONOCYTES # BLD AUTO: 0.28 K/UL — SIGNIFICANT CHANGE UP (ref 0–0.9)
MONOCYTES NFR BLD AUTO: 4.1 % — SIGNIFICANT CHANGE UP (ref 2–14)
NEUTROPHILS # BLD AUTO: 4.84 K/UL — SIGNIFICANT CHANGE UP (ref 1.8–7.4)
NEUTROPHILS NFR BLD AUTO: 71.5 % — SIGNIFICANT CHANGE UP (ref 43–77)
NRBC # BLD: 0 /100 WBCS — SIGNIFICANT CHANGE UP (ref 0–0)
NRBC # FLD: 0 K/UL — SIGNIFICANT CHANGE UP (ref 0–0)
PLATELET # BLD AUTO: 233 K/UL — SIGNIFICANT CHANGE UP (ref 150–400)
POTASSIUM SERPL-MCNC: 4.2 MMOL/L — SIGNIFICANT CHANGE UP (ref 3.5–5.3)
POTASSIUM SERPL-SCNC: 4.2 MMOL/L — SIGNIFICANT CHANGE UP (ref 3.5–5.3)
PROT SERPL-MCNC: 7.4 G/DL — SIGNIFICANT CHANGE UP (ref 6–8.3)
RBC # BLD: 4.44 M/UL — SIGNIFICANT CHANGE UP (ref 3.8–5.2)
RBC # FLD: 12.9 % — SIGNIFICANT CHANGE UP (ref 10.3–14.5)
SODIUM SERPL-SCNC: 135 MMOL/L — SIGNIFICANT CHANGE UP (ref 135–145)
TROPONIN T, HIGH SENSITIVITY RESULT: <6 NG/L — SIGNIFICANT CHANGE UP
WBC # BLD: 6.77 K/UL — SIGNIFICANT CHANGE UP (ref 3.8–10.5)
WBC # FLD AUTO: 6.77 K/UL — SIGNIFICANT CHANGE UP (ref 3.8–10.5)

## 2024-07-17 PROCEDURE — 99285 EMERGENCY DEPT VISIT HI MDM: CPT

## 2024-07-17 PROCEDURE — 71275 CT ANGIOGRAPHY CHEST: CPT | Mod: 26,MC

## 2024-07-17 PROCEDURE — 93010 ELECTROCARDIOGRAM REPORT: CPT

## 2024-07-17 PROCEDURE — 74177 CT ABD & PELVIS W/CONTRAST: CPT | Mod: 26,MC

## 2024-07-17 RX ORDER — ONDANSETRON HYDROCHLORIDE 2 MG/ML
4 INJECTION INTRAMUSCULAR; INTRAVENOUS ONCE
Refills: 0 | Status: COMPLETED | OUTPATIENT
Start: 2024-07-17 | End: 2024-07-17

## 2024-07-17 RX ORDER — SODIUM CHLORIDE 0.9 % (FLUSH) 0.9 %
1000 SYRINGE (ML) INJECTION ONCE
Refills: 0 | Status: COMPLETED | OUTPATIENT
Start: 2024-07-17 | End: 2024-07-17

## 2024-07-17 RX ORDER — ACETAMINOPHEN 325 MG
1000 TABLET ORAL ONCE
Refills: 0 | Status: COMPLETED | OUTPATIENT
Start: 2024-07-17 | End: 2024-07-17

## 2024-07-17 RX ADMIN — Medication 400 MILLIGRAM(S): at 12:12

## 2024-07-17 RX ADMIN — Medication 1000 MILLILITER(S): at 12:12

## 2024-07-17 RX ADMIN — Medication 1000 MILLIGRAM(S): at 14:44

## 2024-07-17 RX ADMIN — ONDANSETRON HYDROCHLORIDE 4 MILLIGRAM(S): 2 INJECTION INTRAMUSCULAR; INTRAVENOUS at 12:12
